# Patient Record
Sex: FEMALE | Race: WHITE | NOT HISPANIC OR LATINO | Employment: UNEMPLOYED | URBAN - METROPOLITAN AREA
[De-identification: names, ages, dates, MRNs, and addresses within clinical notes are randomized per-mention and may not be internally consistent; named-entity substitution may affect disease eponyms.]

---

## 2019-05-19 ENCOUNTER — HISTORICAL (OUTPATIENT)
Dept: URGENT CARE | Facility: CLINIC | Age: 64
End: 2019-05-19

## 2019-05-19 LAB
BILIRUB SERPL-MCNC: NEGATIVE MG/DL
BLOOD URINE, POC: NEGATIVE
CLARITY, POC UA: CLEAR
COLOR, POC UA: NORMAL
GLUCOSE UR QL STRIP: NEGATIVE
KETONES UR QL STRIP: NEGATIVE
LEUKOCYTE EST, POC UA: NORMAL
NITRITE, POC UA: NEGATIVE
PH, POC UA: 6.5
PROTEIN, POC: NEGATIVE
SPECIFIC GRAVITY, POC UA: 1.01
UROBILINOGEN, POC UA: NORMAL

## 2022-04-11 ENCOUNTER — HISTORICAL (OUTPATIENT)
Dept: ADMINISTRATIVE | Facility: HOSPITAL | Age: 67
End: 2022-04-11

## 2022-04-27 VITALS — DIASTOLIC BLOOD PRESSURE: 73 MMHG | OXYGEN SATURATION: 95 % | HEIGHT: 64 IN | SYSTOLIC BLOOD PRESSURE: 121 MMHG

## 2022-08-23 DIAGNOSIS — K44.0 DIAPHRAGMATIC HERNIA WITH OBSTRUCTION: Primary | ICD-10-CM

## 2022-08-24 ENCOUNTER — HOSPITAL ENCOUNTER (OUTPATIENT)
Dept: RADIOLOGY | Facility: HOSPITAL | Age: 67
Discharge: HOME | End: 2022-08-24
Attending: SURGERY
Payer: MEDICARE

## 2022-08-24 DIAGNOSIS — K44.0 DIAPHRAGMATIC HERNIA WITH OBSTRUCTION: ICD-10-CM

## 2022-08-24 PROCEDURE — A9698 NON-RAD CONTRAST MATERIALNOC: HCPCS | Performed by: SURGERY

## 2022-08-24 PROCEDURE — 25500020 PHARM REV CODE 255: Performed by: SURGERY

## 2022-08-24 PROCEDURE — 74240 X-RAY XM UPR GI TRC 1CNTRST: CPT | Mod: TC

## 2022-08-24 RX ADMIN — BARIUM SULFATE 100 ML: 980 POWDER, FOR SUSPENSION ORAL at 08:08

## 2022-08-24 RX ADMIN — BARIUM SULFATE 100 ML: 0.6 SUSPENSION ORAL at 08:08

## 2022-08-29 ENCOUNTER — LAB VISIT (OUTPATIENT)
Dept: LAB | Facility: HOSPITAL | Age: 67
End: 2022-08-29
Attending: SURGERY
Payer: MEDICARE

## 2022-08-29 DIAGNOSIS — E51.9 VITAMIN B1 DEFICIENCY: ICD-10-CM

## 2022-08-29 DIAGNOSIS — D51.1 VITAMIN B12 DEFICIENCY ANEMIA DUE TO MALABSORPTION WITH PROTEINURIA: ICD-10-CM

## 2022-08-29 DIAGNOSIS — E61.1 IRON DEFICIENCY: Primary | ICD-10-CM

## 2022-08-29 DIAGNOSIS — T45.2X6A UNDERDOSING OF VITAMIN: ICD-10-CM

## 2022-08-29 DIAGNOSIS — E11.9 DM (DIABETES MELLITUS): ICD-10-CM

## 2022-08-29 LAB
ALBUMIN SERPL-MCNC: 3.6 GM/DL (ref 3.4–4.8)
ALBUMIN/GLOB SERPL: 1.1 RATIO (ref 1.1–2)
ALP SERPL-CCNC: 73 UNIT/L (ref 40–150)
ALT SERPL-CCNC: 48 UNIT/L (ref 0–55)
AST SERPL-CCNC: 32 UNIT/L (ref 5–34)
BILIRUBIN DIRECT+TOT PNL SERPL-MCNC: 0.3 MG/DL
BUN SERPL-MCNC: 15.5 MG/DL (ref 9.8–20.1)
CALCIUM SERPL-MCNC: 10.4 MG/DL (ref 8.4–10.2)
CHLORIDE SERPL-SCNC: 104 MMOL/L (ref 98–107)
CO2 SERPL-SCNC: 28 MMOL/L (ref 23–31)
CREAT SERPL-MCNC: 0.97 MG/DL (ref 0.55–1.02)
DEPRECATED CALCIDIOL+CALCIFEROL SERPL-MC: 29.3 NG/ML (ref 30–80)
ERYTHROCYTE [DISTWIDTH] IN BLOOD BY AUTOMATED COUNT: 12.7 % (ref 11.5–17)
EST. AVERAGE GLUCOSE BLD GHB EST-MCNC: 131.2 MG/DL
GFR SERPLBLD CREATININE-BSD FMLA CKD-EPI: >60 MLS/MIN/1.73/M2
GLOBULIN SER-MCNC: 3.4 GM/DL (ref 2.4–3.5)
GLUCOSE SERPL-MCNC: 99 MG/DL (ref 82–115)
HBA1C MFR BLD: 6.2 %
HCT VFR BLD AUTO: 38.1 % (ref 37–47)
HGB BLD-MCNC: 12.5 GM/DL (ref 12–16)
IRON SATN MFR SERPL: 28 % (ref 20–50)
IRON SERPL-MCNC: 86 UG/DL (ref 50–170)
MCH RBC QN AUTO: 30.5 PG (ref 27–31)
MCHC RBC AUTO-ENTMCNC: 32.8 MG/DL (ref 33–36)
MCV RBC AUTO: 92.9 FL (ref 80–94)
NRBC BLD AUTO-RTO: 0 %
PLATELET # BLD AUTO: 302 X10(3)/MCL (ref 130–400)
PMV BLD AUTO: 9.2 FL (ref 7.4–10.4)
POTASSIUM SERPL-SCNC: 5.4 MMOL/L (ref 3.5–5.1)
PROT SERPL-MCNC: 7 GM/DL (ref 5.8–7.6)
RBC # BLD AUTO: 4.1 X10(6)/MCL (ref 4.2–5.4)
SODIUM SERPL-SCNC: 138 MMOL/L (ref 136–145)
TIBC SERPL-MCNC: 224 UG/DL (ref 70–310)
TIBC SERPL-MCNC: 310 UG/DL (ref 250–450)
TRANSFERRIN SERPL-MCNC: 275 MG/DL (ref 173–360)
VIT B12 SERPL-MCNC: 263 PG/ML (ref 213–816)
WBC # SPEC AUTO: 7.3 X10(3)/MCL (ref 4.5–11.5)

## 2022-08-29 PROCEDURE — 83540 ASSAY OF IRON: CPT

## 2022-08-29 PROCEDURE — 85027 COMPLETE CBC AUTOMATED: CPT

## 2022-08-29 PROCEDURE — 80053 COMPREHEN METABOLIC PANEL: CPT

## 2022-08-29 PROCEDURE — 83036 HEMOGLOBIN GLYCOSYLATED A1C: CPT

## 2022-08-29 PROCEDURE — 82306 VITAMIN D 25 HYDROXY: CPT

## 2022-08-29 PROCEDURE — 36415 COLL VENOUS BLD VENIPUNCTURE: CPT

## 2022-08-29 PROCEDURE — 82607 VITAMIN B-12: CPT

## 2022-09-21 ENCOUNTER — HISTORICAL (OUTPATIENT)
Dept: ADMINISTRATIVE | Facility: HOSPITAL | Age: 67
End: 2022-09-21
Payer: MEDICARE

## 2024-10-08 ENCOUNTER — TELEPHONE (OUTPATIENT)
Dept: SURGERY | Facility: CLINIC | Age: 69
End: 2024-10-08
Payer: MEDICARE

## 2024-10-08 DIAGNOSIS — K21.9 GASTROESOPHAGEAL REFLUX DISEASE, UNSPECIFIED WHETHER ESOPHAGITIS PRESENT: Primary | ICD-10-CM

## 2024-10-08 RX ORDER — TERBINAFINE HYDROCHLORIDE 250 MG/1
250 TABLET ORAL
COMMUNITY
Start: 2024-08-01

## 2024-10-08 RX ORDER — AMLODIPINE BESYLATE 5 MG/1
5 TABLET ORAL
COMMUNITY
Start: 2024-03-28

## 2024-10-08 RX ORDER — ALBUTEROL SULFATE 90 UG/1
2 INHALANT RESPIRATORY (INHALATION) EVERY 4 HOURS PRN
COMMUNITY
Start: 2024-03-28 | End: 2025-03-28

## 2024-10-08 RX ORDER — PRAMIPEXOLE DIHYDROCHLORIDE 0.5 MG/1
0.5 TABLET ORAL NIGHTLY
COMMUNITY
Start: 2024-09-13

## 2024-10-08 RX ORDER — DICYCLOMINE HYDROCHLORIDE 10 MG/1
10 CAPSULE ORAL
COMMUNITY
Start: 2024-09-19

## 2024-10-08 RX ORDER — DULOXETIN HYDROCHLORIDE 60 MG/1
60 CAPSULE, DELAYED RELEASE ORAL
COMMUNITY

## 2024-10-08 RX ORDER — PANTOPRAZOLE SODIUM 40 MG/1
40 TABLET, DELAYED RELEASE ORAL
COMMUNITY

## 2024-10-08 RX ORDER — VALSARTAN AND HYDROCHLOROTHIAZIDE 160; 25 MG/1; MG/1
1 TABLET ORAL
COMMUNITY
Start: 2024-09-03

## 2024-10-08 RX ORDER — ATORVASTATIN CALCIUM 20 MG/1
20 TABLET, FILM COATED ORAL NIGHTLY
COMMUNITY

## 2024-10-08 RX ORDER — ASPIRIN 81 MG/1
81 TABLET ORAL
COMMUNITY

## 2024-10-08 RX ORDER — TIRZEPATIDE 10 MG/.5ML
INJECTION, SOLUTION SUBCUTANEOUS
COMMUNITY
Start: 2024-10-07

## 2024-10-08 RX ORDER — TRAZODONE HYDROCHLORIDE 50 MG/1
50 TABLET ORAL NIGHTLY
COMMUNITY
Start: 2024-05-06

## 2024-10-08 RX ORDER — ALPRAZOLAM 0.5 MG/1
0.5 TABLET ORAL DAILY PRN
COMMUNITY

## 2024-10-08 RX ORDER — METFORMIN HYDROCHLORIDE 500 MG/1
500 TABLET ORAL 2 TIMES DAILY
COMMUNITY

## 2024-10-08 RX ORDER — LEVOTHYROXINE SODIUM 175 UG/1
175 TABLET ORAL
COMMUNITY

## 2024-10-08 RX ORDER — NABUMETONE 750 MG/1
750 TABLET, FILM COATED ORAL 2 TIMES DAILY
COMMUNITY
Start: 2024-09-13

## 2024-10-08 RX ORDER — TRAMADOL HYDROCHLORIDE 50 MG/1
50 TABLET ORAL EVERY 6 HOURS PRN
COMMUNITY

## 2024-10-08 NOTE — TELEPHONE ENCOUNTER
Esophagram scheduled 10/10.  Pt will do labs at PCPs office.   Appt scheduled 10/22 for appt with JANIA Soriano

## 2024-10-08 NOTE — TELEPHONE ENCOUNTER
Yes ok for appt. Will need some workup.   - Esophagram  - EGD within 12 months (previous one in 2022 with Dr. Grimm), will need a repeat.   - Bariatric labs.

## 2024-10-10 ENCOUNTER — HOSPITAL ENCOUNTER (OUTPATIENT)
Dept: RADIOLOGY | Facility: HOSPITAL | Age: 69
Discharge: HOME | End: 2024-10-10
Attending: SURGERY
Payer: MEDICARE

## 2024-10-10 DIAGNOSIS — K21.9 GASTROESOPHAGEAL REFLUX DISEASE, UNSPECIFIED WHETHER ESOPHAGITIS PRESENT: ICD-10-CM

## 2024-10-10 PROCEDURE — 25500020 PHARM REV CODE 255: Performed by: SURGERY

## 2024-10-10 PROCEDURE — A9698 NON-RAD CONTRAST MATERIALNOC: HCPCS | Performed by: SURGERY

## 2024-10-10 PROCEDURE — 74220 X-RAY XM ESOPHAGUS 1CNTRST: CPT | Mod: TC

## 2024-10-10 RX ADMIN — BARIUM SULFATE 50 ML: 0.6 SUSPENSION ORAL at 08:10

## 2024-10-10 RX ADMIN — BARIUM SULFATE 60 ML: 980 POWDER, FOR SUSPENSION ORAL at 08:10

## 2024-10-17 LAB
ALBUMIN SERPL-MCNC: 4 G/DL (ref 3.9–4.9)
ALP SERPL-CCNC: 69 IU/L (ref 44–121)
ALT SERPL-CCNC: 22 IU/L (ref 0–32)
AST SERPL-CCNC: 21 IU/L (ref 0–40)
BASOPHILS # BLD AUTO: 0.1 X10E3/UL (ref 0–0.2)
BASOPHILS NFR BLD AUTO: 1 %
BILIRUB SERPL-MCNC: 0.2 MG/DL (ref 0–1.2)
BUN SERPL-MCNC: 32 MG/DL (ref 8–27)
BUN/CREAT SERPL: 31 (ref 12–28)
CALCIUM SERPL-MCNC: 10.3 MG/DL (ref 8.7–10.3)
CHLORIDE SERPL-SCNC: 100 MMOL/L (ref 96–106)
CO2 SERPL-SCNC: 23 MMOL/L (ref 20–29)
CREAT SERPL-MCNC: 1.03 MG/DL (ref 0.57–1)
EOSINOPHIL # BLD AUTO: 0.3 X10E3/UL (ref 0–0.4)
EOSINOPHIL NFR BLD AUTO: 5 %
ERYTHROCYTE [DISTWIDTH] IN BLOOD BY AUTOMATED COUNT: 13.1 % (ref 11.7–15.4)
EST. GFR  (NO RACE VARIABLE): 60 ML/MIN/1.73
GLOBULIN SER CALC-MCNC: 3.1 G/DL (ref 1.5–4.5)
GLUCOSE SERPL-MCNC: 125 MG/DL (ref 70–99)
HCT VFR BLD AUTO: 39.1 % (ref 34–46.6)
HGB BLD-MCNC: 12.2 G/DL (ref 11.1–15.9)
IMM GRANULOCYTES NFR BLD AUTO: 0 %
IRON SATN MFR SERPL: 25 % (ref 15–55)
IRON SERPL-MCNC: 90 UG/DL (ref 27–139)
LYMPHOCYTES # BLD AUTO: 1.3 X10E3/UL (ref 0.7–3.1)
LYMPHOCYTES NFR BLD AUTO: 20 %
MCH RBC QN AUTO: 29.8 PG (ref 26.6–33)
MCHC RBC AUTO-ENTMCNC: 31.2 G/DL (ref 31.5–35.7)
MCV RBC AUTO: 95 FL (ref 79–97)
MONOCYTES # BLD AUTO: 0.7 X10E3/UL (ref 0.1–0.9)
MONOCYTES NFR BLD AUTO: 10 %
NEUTROPHILS # BLD AUTO: 4.3 X10E3/UL (ref 1.4–7)
NEUTROPHILS NFR BLD AUTO: 64 %
PLATELET # BLD AUTO: 328 X10E3/UL (ref 150–450)
POTASSIUM SERPL-SCNC: 4.2 MMOL/L (ref 3.5–5.2)
PROT SERPL-MCNC: 7.1 G/DL (ref 6–8.5)
RBC # BLD AUTO: 4.1 X10E6/UL (ref 3.77–5.28)
SODIUM SERPL-SCNC: 141 MMOL/L (ref 134–144)
TIBC SERPL-MCNC: 353 UG/DL (ref 250–450)
UIBC SERPL-MCNC: 263 UG/DL (ref 118–369)
VIT B1 BLD-SCNC: 124.9 NMOL/L (ref 66.5–200)
VIT B12 SERPL-MCNC: 298 PG/ML (ref 232–1245)
WBC # BLD AUTO: 6.8 X10E3/UL (ref 3.4–10.8)

## 2024-10-22 ENCOUNTER — OFFICE VISIT (OUTPATIENT)
Dept: SURGERY | Facility: CLINIC | Age: 69
End: 2024-10-22
Payer: MEDICARE

## 2024-10-22 VITALS
WEIGHT: 282.19 LBS | DIASTOLIC BLOOD PRESSURE: 76 MMHG | HEART RATE: 94 BPM | SYSTOLIC BLOOD PRESSURE: 133 MMHG | HEIGHT: 64 IN | BODY MASS INDEX: 48.18 KG/M2

## 2024-10-22 DIAGNOSIS — Z90.3 HISTORY OF SLEEVE GASTRECTOMY: ICD-10-CM

## 2024-10-22 DIAGNOSIS — K44.9 HIATAL HERNIA: Primary | ICD-10-CM

## 2024-10-22 DIAGNOSIS — Z01.818 PREOP EXAMINATION: ICD-10-CM

## 2024-10-22 PROCEDURE — 99204 OFFICE O/P NEW MOD 45 MIN: CPT | Mod: ,,, | Performed by: SURGERY

## 2024-10-22 RX ORDER — AMOXICILLIN AND CLAVULANATE POTASSIUM 875; 125 MG/1; MG/1
1 TABLET, FILM COATED ORAL 2 TIMES DAILY
COMMUNITY

## 2024-10-22 NOTE — PROGRESS NOTES
[facility] Surgical - General Surgery Services  [address] [place] [id number]  Phone: [phone]  Fax: [phone]     HISTORY & PHYSICAL  General Surgery  Dr. Bebo Yepez      Patient Name: Chapis Brar  YOB: 1955  Author: ISATU Louie     Date: 10/22/2024                   SUBJECTIVE:     Chief Complaint/Reason for Admission:   Chief Complaint   Patient presents with    Consult     Hiatal Hernia; HX of VSG          History of Present Illness:  Ms. Chapis Brar is a 69 y.o. female that presents to clinic for surgical evaluation she was found to have a hiatal hernia.     Recent ED visit at [facility] for LLQ abd pain 10/20/2024. Treated with IV Zosyn and discharged with Augmentin.   CT confirmed diverticulitis. CT also mentioned stable hiatal hernia.      Previous history of Laparoscopic sleeve gastrectomy 9/8/2015 per Dr. Bebo Yepez.  Previous Laparoscopic ventral hernia repair with 9 cm Round Parietex mesh 1/20/2017 Dr. Seth Hogan.       CT imaging report from [facility]:    Impression      Findings suggestive of acute diverticulitis at junction of left and sigmoid colon.  No evidence for abscess or extraluminal air.    Electronically signed by: Charlotte Vallecillo MD on 10/20/2024 10:00:11 PM US/Central  Per PQRS, all CT exams are performed using one or more of the following dose reduction techniques: automated exposure control, adjustment of the mA and/or kV according to patient size, or use of iterative reconstruction technique.  Narrative    EXAM:  CT Abdomen and Pelvis with Intravenous Contrast    CLINICAL HISTORY:  Hx:LLQ abdominal pain.    TECHNIQUE:  Axial computed tomography images of the abdomen and pelvis with intravenous contrast.    CONTRAST:  With; 100 ml OMNIPAQUE 300    COMPARISON:  CT/OT/SR - CT ABDOMEN PELVIS W IV CONTRAST - 09/24/2023 04:57 PM CDT    FINDINGS:    LUNG BASES:  No basilar airspace consolidation or pleural  effusion.    LIVER:  Unremarkable.    GALLBLADDER AND BILE DUCTS:  Status post cholecystectomy.    PANCREAS:  Unremarkable.    SPLEEN:  Unremarkable.    ADRENAL GLANDS:  Unremarkable.    KIDNEYS, URETERS, AND BLADDER:  Kidneys are stable in size and enhancement. Right kidney remains rotated. Stable right renal cyst that appears simple.    STOMACH AND BOWEL:  Stable hiatal hernia and gastric sleeve changes.  There is colonic diverticulosis. Asymmetric wall thickening and adjacent mesenteric soft tissue infiltration along the proximal lateral colon at the junction of the left and sigmoid colon suggesting acute diverticulitis. No extraluminal air or evidence for abscess. Stable right periumbilical hernia that contains anterior wall of the transverse colon. No evidence for wall thickening or surrounding soft tissue stranding. No evidence for bowel obstruction.    APPENDIX:  No CT evidence for appendicitis.    PERITONEUM:  No free fluid. No free air.    LYMPH NODES:  No lymphadenopathy.    REPRODUCTIVE:  Unremarkable as visualized.    VASCULATURE:  No aortic aneurysm.    BONES:  Skeleton is osteopenic. There is lumbar spondylosis with degenerative disc disease. No acute fracture or bone destruction. There is grade 1 anterolisthesis at L4-5.    ABDOMINAL WALL AND SOFT TISSUES:  Unremarkable.  Exam End: 10/20/24 20:55       Referring provider: No ref. provider found   PCP: Magno Enciso MD     Review of Systems:  12 point ROS negative except as stated in HPI    PAST HISTORY:     Past Medical History:   Diagnosis Date    Anxiety     Arthritis     Asthenia     Chronic back pain     Depression     Diabetes mellitus     Diverticulitis     GERD (gastroesophageal reflux disease)     Hiatal hernia     Hypertension     Kidney stones     Osteoarthritis     Sleep apnea, unspecified     Thyroid disease     Vitamin D deficiency      Past Surgical History:   Procedure Laterality Date    CHOLECYSTECTOMY      DILATION AND CURETTAGE  OF UTERUS      EXCISIONAL HEMORRHOIDECTOMY      HYSTERECTOMY      INGUINAL HERNIA REPAIR Right     LAPAROSCOPIC REPAIR OF VENTRAL HERNIA  01/20/2017    Dr. Seth Hogan    LAPAROSCOPIC SLEEVE GASTRECTOMY  09/08/2015    Dr. Yepez    TOTAL KNEE ARTHROPLASTY      WISDOM TOOTH EXTRACTION       No family history on file.  Social History     Socioeconomic History    Marital status:        MEDICATIONS & ALLERGIES:     Current Outpatient Medications on File Prior to Visit   Medication Sig    albuterol (PROVENTIL/VENTOLIN HFA) 90 mcg/actuation inhaler Inhale 2 puffs into the lungs every 4 (four) hours as needed.    ALPRAZolam (XANAX) 0.5 MG tablet Take 0.5 mg by mouth daily as needed.    amLODIPine (NORVASC) 5 MG tablet Take 5 mg by mouth.    aspirin (ECOTRIN) 81 MG EC tablet Take 81 mg by mouth.    atorvastatin (LIPITOR) 20 MG tablet Take 20 mg by mouth every evening.    dicyclomine (BENTYL) 10 MG capsule Take 10 mg by mouth.    DULoxetine (CYMBALTA) 60 MG capsule Take 60 mg by mouth.    levothyroxine (SYNTHROID, LEVOTHROID) 175 MCG tablet Take 175 mcg by mouth.    metFORMIN (GLUCOPHAGE) 500 MG tablet Take 500 mg by mouth 2 (two) times daily.    MOUNJARO 10 mg/0.5 mL PnIj SMARTSIG:10 Milligram(s) SUB-Q Once a Week    nabumetone (RELAFEN) 750 MG tablet Take 750 mg by mouth 2 (two) times daily.    pantoprazole (PROTONIX) 40 MG tablet Take 40 mg by mouth.    pramipexole (MIRAPEX) 0.5 MG tablet Take 0.5 mg by mouth every evening.    terbinafine HCL (LAMISIL) 250 mg tablet Take 250 mg by mouth.    traMADoL (ULTRAM) 50 mg tablet Take 50 mg by mouth every 6 (six) hours as needed.    traZODone (DESYREL) 50 MG tablet Take 50 mg by mouth every evening.    valsartan-hydrochlorothiazide (DIOVAN-HCT) 160-25 mg per tablet Take 1 tablet by mouth.     No current facility-administered medications on file prior to visit.     Review of patient's allergies indicates:  Not on File    OBJECTIVE:   There were no vitals filed for this  visit.  There is no height or weight on file to calculate BMI.    Physical Exam:  General:  Well developed, well nourished, no acute distress  HEENT:  Normocephalic, atraumatic, PERRL, EOMI, clear sclera, ears normal, neck supple, throat clear without erythema or exudates  CVS:  RRR, S1 and S2 normal, no murmurs, rubs, gallops  Resp:  Lungs clear to auscultation, no wheezes, rales, rhonchi, cough  GI:  Abdomen soft, non-tender, non-distended, normoactive bowel sounds, no masses  :  Deferred  MSK:  No muscle atrophy, cyanosis, peripheral edema, full range of motion  Skin:  No rashes, ulcers, erythema  Neuro:  CNII-XII grossly intact  Psych:  Alert and oriented to person, place, and time    Results:  I have independently reviewed all pertinent lab and radiologic studies relevant to general surgery.      VISIT DIAGNOSES:       ICD-10-CM ICD-9-CM   1. Preop examination  Z01.818 V72.84       ASSESSMENT/PLAN:     SP Lap VSG and Lap incisional hernia, active diverticulitis under treatment    Plan: cont current treatment per treating providers, no need for surgical therapy on HH at this time  Routine bariatric fu  Dr. Yepez examined patient, discussed recommendations, obtained informed consent, and answered all questions with patient/family.     Bebo Yepez MD      RTC annual bariatric visit or prn

## 2024-11-18 DIAGNOSIS — K57.92 DIVERTICULITIS: Primary | ICD-10-CM

## 2024-11-21 ENCOUNTER — OFFICE VISIT (OUTPATIENT)
Dept: SURGICAL ONCOLOGY | Facility: CLINIC | Age: 69
End: 2024-11-21
Payer: MEDICARE

## 2024-11-21 VITALS
BODY MASS INDEX: 47.43 KG/M2 | WEIGHT: 277.81 LBS | HEIGHT: 64 IN | HEART RATE: 85 BPM | SYSTOLIC BLOOD PRESSURE: 109 MMHG | DIASTOLIC BLOOD PRESSURE: 71 MMHG

## 2024-11-21 DIAGNOSIS — K57.92 DIVERTICULITIS: Primary | ICD-10-CM

## 2024-11-21 DIAGNOSIS — K57.92 DIVERTICULITIS: ICD-10-CM

## 2024-11-21 PROCEDURE — 99999 PR PBB SHADOW E&M-EST. PATIENT-LVL IV: CPT | Mod: PBBFAC,,, | Performed by: COLON & RECTAL SURGERY

## 2024-11-21 PROCEDURE — 99204 OFFICE O/P NEW MOD 45 MIN: CPT | Mod: S$PBB,,, | Performed by: COLON & RECTAL SURGERY

## 2024-11-21 PROCEDURE — 99214 OFFICE O/P EST MOD 30 MIN: CPT | Mod: PBBFAC | Performed by: COLON & RECTAL SURGERY

## 2024-11-21 RX ORDER — ENOXAPARIN SODIUM 100 MG/ML
40 INJECTION SUBCUTANEOUS EVERY 24 HOURS
OUTPATIENT
Start: 2024-11-21

## 2024-11-21 RX ORDER — ACETAMINOPHEN 500 MG
1000 TABLET ORAL
OUTPATIENT
Start: 2024-11-21 | End: 2024-11-21

## 2024-11-21 RX ORDER — METRONIDAZOLE 500 MG/100ML
500 INJECTION, SOLUTION INTRAVENOUS
OUTPATIENT
Start: 2024-11-21

## 2024-11-21 RX ORDER — LIDOCAINE HYDROCHLORIDE 10 MG/ML
1 INJECTION, SOLUTION EPIDURAL; INFILTRATION; INTRACAUDAL; PERINEURAL ONCE
OUTPATIENT
Start: 2024-11-21 | End: 2024-11-21

## 2024-11-21 RX ORDER — NEOMYCIN SULFATE 500 MG/1
1000 TABLET ORAL 3 TIMES DAILY
Qty: 6 TABLET | Refills: 0 | Status: SHIPPED | OUTPATIENT
Start: 2024-11-21

## 2024-11-21 RX ORDER — CELECOXIB 200 MG/1
400 CAPSULE ORAL
OUTPATIENT
Start: 2024-11-21 | End: 2024-11-21

## 2024-11-21 RX ORDER — GABAPENTIN 100 MG/1
200 CAPSULE ORAL
OUTPATIENT
Start: 2024-11-21

## 2024-11-21 RX ORDER — METRONIDAZOLE 500 MG/1
500 TABLET ORAL 3 TIMES DAILY
Qty: 3 TABLET | Refills: 0 | Status: SHIPPED | OUTPATIENT
Start: 2024-11-21

## 2024-11-21 NOTE — H&P (VIEW-ONLY)
Patient ID: [id number]     Chief Complaint: Diverticulitis      HPI:     Chapis Brar is a 69 y.o. female here today for an initial consultation.  She was referred by Dr. Augie Grimm for management of recurrent diverticulitis.  She reports 4 ER visits for left lower quadrant abdominal pain as well as 2 other episodes for which she was treated with oral antibiotics by her PCP.  She has 3 CT confirmed episodes of acute diverticulitis over the past 3 years.  Her most recent episode was 10/20/2024.  She is seeking definitive surgery.  Today she has no complaints.  Her most recent colonoscopy 10/18/2022 showed scattered sigmoid diverticulosis.    Past Medical History:  has a past medical history of Anxiety, Arthritis, Asthenia, Chronic back pain, Depression, Diabetes mellitus, Diverticulitis, GERD (gastroesophageal reflux disease), Hiatal hernia, Hypertension, Kidney stones, Osteoarthritis, Sleep apnea, unspecified, Thyroid disease, and Vitamin D deficiency.    Surgical History:  has a past surgical history that includes Laparoscopic sleeve gastrectomy (09/08/2015); Laparoscopic repair of ventral hernia (01/20/2017); Cholecystectomy; Dilation and curettage of uterus; [place] tooth extraction; Excisional hemorrhoidectomy; Hysterectomy; Inguinal hernia repair (Right); and Total knee arthroplasty.    Family History: Family history is unknown by patient.    Social History:  reports that she has never smoked. She has never used smokeless tobacco. She reports that she does not currently use alcohol.    Current Outpatient Medications   Medication Instructions    albuterol (PROVENTIL/VENTOLIN HFA) 90 mcg/actuation inhaler 2 puffs, Every 4 hours PRN    ALPRAZolam (XANAX) 0.5 mg, Daily PRN    amLODIPine (NORVASC) 5 mg    amoxicillin-clavulanate 875-125mg (AUGMENTIN) 875-125 mg per tablet 1 tablet, 2 times daily    aspirin (ECOTRIN) 81 mg    atorvastatin (LIPITOR) 20 mg, Nightly    DULoxetine (CYMBALTA) 60 mg    levothyroxine  "(SYNTHROID, LEVOTHROID) 175 mcg    metFORMIN (GLUCOPHAGE) 500 mg, 2 times daily    metroNIDAZOLE (FLAGYL) 500 mg, Oral, 3 times daily    MOUNJARO 10 mg/0.5 mL PnIj Mondays, but took it today she for got to take it yesterday    nabumetone (RELAFEN) 750 mg, 2 times daily    neomycin (MYCIFRADIN) 1,000 mg, Oral, 3 times daily    pantoprazole (PROTONIX) 40 mg    pramipexole (MIRAPEX) 0.5 mg, Nightly    terbinafine HCL (LAMISIL) 250 mg    traMADoL (ULTRAM) 50 mg, Every 6 hours PRN    traZODone (DESYREL) 50 mg, Nightly    valsartan-hydrochlorothiazide (DIOVAN-HCT) 160-25 mg per tablet 1 tablet       Patient has No Known Allergies.     Patient Care Team:  Magno Enciso MD as PCP - General (Family Medicine)  Bebo Yepez MD as Surgeon (Bariatrics)  Scott Grimm MD (Gastroenterology)       Subjective:     Review of Systems    12 point review of systems conducted, negative except as stated in the history of present illness. See HPI for details.      Objective:     Visit Vitals  /71 (BP Location: Left arm, Patient Position: Sitting)   Pulse 85   Ht 5' 4" (1.626 m)   Wt 126 kg (277 lb 12.8 oz)   SpO2 (P) 96%   BMI 47.68 kg/m²       Physical Exam    General: Alert and oriented, No acute distress.  Head: Normocephalic, Atraumatic.  Eye: Pupils are equal and round, Extraocular movements are intact, Sclera non-icteric.  Ears/Nose/Throat: Normal, Mucosa moist, Clear.  Respiratory: No wheezes, Non-labored respirations, Symmetrical chest wall expansion.  Cardiovascular: Regular rate.  Gastrointestinal: Obese, Soft, Non-tender, Non-distended, Normal bowel sounds, No palpable masses.  Healed low midline surgical scar.  Integumentary: Warm, Dry, Intact, No rashes.  Extremities: No edema.  Neurologic: No focal deficits.  Psychiatric: Normal interaction, Coherent speech, Euthymic mood, Appropriate affect.       Labs Reviewed:     Chemistry:  Lab Results   Component Value Date     10/10/2024    K 4.2 10/10/2024 "    BUN 32 (H) 10/10/2024    CREATININE 1.03 (H) 10/10/2024    EGFRNORACEVR 60 10/10/2024    GLUCOSE 99 08/29/2022    CALCIUM 10.3 10/10/2024    ALKPHOS 73 08/29/2022    LABPROT 7.0 08/29/2022    ALBUMIN 4.0 10/10/2024    BILIDIR 0.10 03/01/2020    IBILI 0.20 03/01/2020    AST 21 10/10/2024    ALT 22 10/10/2024        Hematology:  Lab Results   Component Value Date    WBC 6.8 10/10/2024    HGB 12.2 10/10/2024    HCT 39.1 10/10/2024     10/10/2024         Assessment:       ICD-10-CM ICD-9-CM   1. Diverticulitis  K57.92 562.11        Plan:     I explained the pathophysiology of the disease process and reason for surgery.  I also explained the surgery, potential adverse events, and expected recovery course.  Schedule laparoscopic sigmoid colectomy 12/17/2024.      No follow-ups on file. In addition to their scheduled follow up, the patient has also been instructed to follow up on as needed basis.     No future appointments.     Alejandro Hood MD

## 2024-11-21 NOTE — PROGRESS NOTES
Patient ID: [id number]     Chief Complaint: Diverticulitis      HPI:     Chapis Brar is a 69 y.o. female here today for an initial consultation.  She was referred by Dr. Augie Grimm for management of recurrent diverticulitis.  She reports 4 ER visits for left lower quadrant abdominal pain as well as 2 other episodes for which she was treated with oral antibiotics by her PCP.  She has 3 CT confirmed episodes of acute diverticulitis over the past 3 years.  Her most recent episode was 10/20/2024.  She is seeking definitive surgery.  Today she has no complaints.  Her most recent colonoscopy 10/18/2022 showed scattered sigmoid diverticulosis.    Past Medical History:  has a past medical history of Anxiety, Arthritis, Asthenia, Chronic back pain, Depression, Diabetes mellitus, Diverticulitis, GERD (gastroesophageal reflux disease), Hiatal hernia, Hypertension, Kidney stones, Osteoarthritis, Sleep apnea, unspecified, Thyroid disease, and Vitamin D deficiency.    Surgical History:  has a past surgical history that includes Laparoscopic sleeve gastrectomy (09/08/2015); Laparoscopic repair of ventral hernia (01/20/2017); Cholecystectomy; Dilation and curettage of uterus; [place] tooth extraction; Excisional hemorrhoidectomy; Hysterectomy; Inguinal hernia repair (Right); and Total knee arthroplasty.    Family History: Family history is unknown by patient.    Social History:  reports that she has never smoked. She has never used smokeless tobacco. She reports that she does not currently use alcohol.    Current Outpatient Medications   Medication Instructions    albuterol (PROVENTIL/VENTOLIN HFA) 90 mcg/actuation inhaler 2 puffs, Every 4 hours PRN    ALPRAZolam (XANAX) 0.5 mg, Daily PRN    amLODIPine (NORVASC) 5 mg    amoxicillin-clavulanate 875-125mg (AUGMENTIN) 875-125 mg per tablet 1 tablet, 2 times daily    aspirin (ECOTRIN) 81 mg    atorvastatin (LIPITOR) 20 mg, Nightly    DULoxetine (CYMBALTA) 60 mg    levothyroxine  "(SYNTHROID, LEVOTHROID) 175 mcg    metFORMIN (GLUCOPHAGE) 500 mg, 2 times daily    metroNIDAZOLE (FLAGYL) 500 mg, Oral, 3 times daily    MOUNJARO 10 mg/0.5 mL PnIj Mondays, but took it today she for got to take it yesterday    nabumetone (RELAFEN) 750 mg, 2 times daily    neomycin (MYCIFRADIN) 1,000 mg, Oral, 3 times daily    pantoprazole (PROTONIX) 40 mg    pramipexole (MIRAPEX) 0.5 mg, Nightly    terbinafine HCL (LAMISIL) 250 mg    traMADoL (ULTRAM) 50 mg, Every 6 hours PRN    traZODone (DESYREL) 50 mg, Nightly    valsartan-hydrochlorothiazide (DIOVAN-HCT) 160-25 mg per tablet 1 tablet       Patient has No Known Allergies.     Patient Care Team:  Magno Enciso MD as PCP - General (Family Medicine)  Bebo Yepez MD as Surgeon (Bariatrics)  Scott Grimm MD (Gastroenterology)       Subjective:     Review of Systems    12 point review of systems conducted, negative except as stated in the history of present illness. See HPI for details.      Objective:     Visit Vitals  /71 (BP Location: Left arm, Patient Position: Sitting)   Pulse 85   Ht 5' 4" (1.626 m)   Wt 126 kg (277 lb 12.8 oz)   SpO2 (P) 96%   BMI 47.68 kg/m²       Physical Exam    General: Alert and oriented, No acute distress.  Head: Normocephalic, Atraumatic.  Eye: Pupils are equal and round, Extraocular movements are intact, Sclera non-icteric.  Ears/Nose/Throat: Normal, Mucosa moist, Clear.  Respiratory: No wheezes, Non-labored respirations, Symmetrical chest wall expansion.  Cardiovascular: Regular rate.  Gastrointestinal: Obese, Soft, Non-tender, Non-distended, Normal bowel sounds, No palpable masses.  Healed low midline surgical scar.  Integumentary: Warm, Dry, Intact, No rashes.  Extremities: No edema.  Neurologic: No focal deficits.  Psychiatric: Normal interaction, Coherent speech, Euthymic mood, Appropriate affect.       Labs Reviewed:     Chemistry:  Lab Results   Component Value Date     10/10/2024    K 4.2 10/10/2024 "    BUN 32 (H) 10/10/2024    CREATININE 1.03 (H) 10/10/2024    EGFRNORACEVR 60 10/10/2024    GLUCOSE 99 08/29/2022    CALCIUM 10.3 10/10/2024    ALKPHOS 73 08/29/2022    LABPROT 7.0 08/29/2022    ALBUMIN 4.0 10/10/2024    BILIDIR 0.10 03/01/2020    IBILI 0.20 03/01/2020    AST 21 10/10/2024    ALT 22 10/10/2024        Hematology:  Lab Results   Component Value Date    WBC 6.8 10/10/2024    HGB 12.2 10/10/2024    HCT 39.1 10/10/2024     10/10/2024         Assessment:       ICD-10-CM ICD-9-CM   1. Diverticulitis  K57.92 562.11        Plan:     I explained the pathophysiology of the disease process and reason for surgery.  I also explained the surgery, potential adverse events, and expected recovery course.  Schedule laparoscopic sigmoid colectomy 12/17/2024.      No follow-ups on file. In addition to their scheduled follow up, the patient has also been instructed to follow up on as needed basis.     No future appointments.     Alejandro Hood MD

## 2024-12-04 ENCOUNTER — ANESTHESIA EVENT (OUTPATIENT)
Dept: SURGERY | Facility: HOSPITAL | Age: 69
DRG: 330 | End: 2024-12-04
Payer: MEDICARE

## 2024-12-10 ENCOUNTER — HOSPITAL ENCOUNTER (OUTPATIENT)
Dept: RADIOLOGY | Facility: HOSPITAL | Age: 69
Discharge: HOME | End: 2024-12-10
Attending: COLON & RECTAL SURGERY
Payer: MEDICARE

## 2024-12-10 DIAGNOSIS — K57.92 DIVERTICULITIS: ICD-10-CM

## 2024-12-10 PROCEDURE — 71046 X-RAY EXAM CHEST 2 VIEWS: CPT | Mod: TC

## 2024-12-12 NOTE — CLINICAL REVIEW
"labs, CXR, EKG reviewed. sd //     cardiology notes ood but sufficient for case. patient noted to have "pleuritic chest pain" intermittently (per [facility] visit 3/28/24; Dr. Enciso OV 10/7/24). EKG same as previous. chart review complete. ccv   "

## 2024-12-16 RX ORDER — ACETAMINOPHEN AND PHENYLEPHRINE HCL 325; 5 MG/1; MG/1
1 TABLET ORAL DAILY
COMMUNITY

## 2024-12-16 RX ORDER — ZINC GLUCONATE 50 MG
50 TABLET ORAL DAILY
COMMUNITY

## 2024-12-17 ENCOUNTER — HOSPITAL ENCOUNTER (INPATIENT)
Facility: HOSPITAL | Age: 69
LOS: 2 days | Discharge: HOME | DRG: 330 | End: 2024-12-19
Attending: COLON & RECTAL SURGERY | Admitting: COLON & RECTAL SURGERY
Payer: MEDICARE

## 2024-12-17 ENCOUNTER — ANESTHESIA (OUTPATIENT)
Dept: SURGERY | Facility: HOSPITAL | Age: 69
DRG: 330 | End: 2024-12-17
Payer: MEDICARE

## 2024-12-17 DIAGNOSIS — K57.32 DIVERTICULITIS LARGE INTESTINE W/O PERFORATION OR ABSCESS W/O BLEEDING: Primary | ICD-10-CM

## 2024-12-17 DIAGNOSIS — K57.92 DIVERTICULITIS: ICD-10-CM

## 2024-12-17 LAB
GROUP & RH: NORMAL
INDIRECT COOMBS: NORMAL
POCT GLUCOSE: 107 MG/DL (ref 70–110)
POCT GLUCOSE: 148 MG/DL (ref 70–110)
POCT GLUCOSE: 174 MG/DL (ref 70–110)
SPECIMEN OUTDATE: NORMAL

## 2024-12-17 PROCEDURE — 27201423 OPTIME MED/SURG SUP & DEVICES STERILE SUPPLY: Performed by: COLON & RECTAL SURGERY

## 2024-12-17 PROCEDURE — 37000009 HC ANESTHESIA EA ADD 15 MINS: Performed by: COLON & RECTAL SURGERY

## 2024-12-17 PROCEDURE — 36000711: Performed by: COLON & RECTAL SURGERY

## 2024-12-17 PROCEDURE — 63600175 PHARM REV CODE 636 W HCPCS

## 2024-12-17 PROCEDURE — 37000008 HC ANESTHESIA 1ST 15 MINUTES: Performed by: COLON & RECTAL SURGERY

## 2024-12-17 PROCEDURE — 36415 COLL VENOUS BLD VENIPUNCTURE: CPT | Performed by: COLON & RECTAL SURGERY

## 2024-12-17 PROCEDURE — 99900031 HC PATIENT EDUCATION (STAT)

## 2024-12-17 PROCEDURE — 71000039 HC RECOVERY, EACH ADD'L HOUR: Performed by: COLON & RECTAL SURGERY

## 2024-12-17 PROCEDURE — 25000003 PHARM REV CODE 250

## 2024-12-17 PROCEDURE — 63600175 PHARM REV CODE 636 W HCPCS: Performed by: ANESTHESIOLOGY

## 2024-12-17 PROCEDURE — 0DTN4ZZ RESECTION OF SIGMOID COLON, PERCUTANEOUS ENDOSCOPIC APPROACH: ICD-10-PCS | Performed by: COLON & RECTAL SURGERY

## 2024-12-17 PROCEDURE — 36000710: Performed by: COLON & RECTAL SURGERY

## 2024-12-17 PROCEDURE — C9290 INJ, BUPIVACAINE LIPOSOME: HCPCS | Performed by: COLON & RECTAL SURGERY

## 2024-12-17 PROCEDURE — 86900 BLOOD TYPING SEROLOGIC ABO: CPT | Performed by: COLON & RECTAL SURGERY

## 2024-12-17 PROCEDURE — 63600175 PHARM REV CODE 636 W HCPCS: Performed by: COLON & RECTAL SURGERY

## 2024-12-17 PROCEDURE — 94799 UNLISTED PULMONARY SVC/PX: CPT

## 2024-12-17 PROCEDURE — 11000001 HC ACUTE MED/SURG PRIVATE ROOM

## 2024-12-17 PROCEDURE — P9047 ALBUMIN (HUMAN), 25%, 50ML: HCPCS | Mod: JZ,JG

## 2024-12-17 PROCEDURE — 25000003 PHARM REV CODE 250: Performed by: COLON & RECTAL SURGERY

## 2024-12-17 PROCEDURE — 71000015 HC POSTOP RECOV 1ST HR: Performed by: COLON & RECTAL SURGERY

## 2024-12-17 PROCEDURE — 71000016 HC POSTOP RECOV ADDL HR: Performed by: COLON & RECTAL SURGERY

## 2024-12-17 PROCEDURE — 71000033 HC RECOVERY, INTIAL HOUR: Performed by: COLON & RECTAL SURGERY

## 2024-12-17 PROCEDURE — 88307 TISSUE EXAM BY PATHOLOGIST: CPT | Performed by: COLON & RECTAL SURGERY

## 2024-12-17 RX ORDER — HYDROMORPHONE HYDROCHLORIDE 2 MG/ML
0.2 INJECTION, SOLUTION INTRAMUSCULAR; INTRAVENOUS; SUBCUTANEOUS EVERY 5 MIN PRN
Status: DISCONTINUED | OUTPATIENT
Start: 2024-12-17 | End: 2024-12-17 | Stop reason: HOSPADM

## 2024-12-17 RX ORDER — EPHEDRINE SULFATE 50 MG/ML
25 INJECTION, SOLUTION INTRAVENOUS ONCE
Status: DISCONTINUED | OUTPATIENT
Start: 2024-12-17 | End: 2024-12-17

## 2024-12-17 RX ORDER — LIDOCAINE HYDROCHLORIDE 10 MG/ML
1 INJECTION, SOLUTION EPIDURAL; INFILTRATION; INTRACAUDAL; PERINEURAL ONCE
Status: DISCONTINUED | OUTPATIENT
Start: 2024-12-17 | End: 2024-12-17

## 2024-12-17 RX ORDER — SODIUM CHLORIDE 9 MG/ML
INJECTION, SOLUTION INTRAVENOUS CONTINUOUS
Status: DISCONTINUED | OUTPATIENT
Start: 2024-12-17 | End: 2024-12-19

## 2024-12-17 RX ORDER — ETOMIDATE 2 MG/ML
INJECTION INTRAVENOUS
Status: DISCONTINUED | OUTPATIENT
Start: 2024-12-17 | End: 2024-12-17

## 2024-12-17 RX ORDER — CALCIUM CHLORIDE INJECTION 100 MG/ML
INJECTION, SOLUTION INTRAVENOUS
Status: DISCONTINUED | OUTPATIENT
Start: 2024-12-17 | End: 2024-12-17

## 2024-12-17 RX ORDER — ACETAMINOPHEN 500 MG
1000 TABLET ORAL
Status: COMPLETED | OUTPATIENT
Start: 2024-12-17 | End: 2024-12-17

## 2024-12-17 RX ORDER — DIPHENHYDRAMINE HYDROCHLORIDE 50 MG/ML
25 INJECTION INTRAMUSCULAR; INTRAVENOUS EVERY 6 HOURS PRN
Status: DISCONTINUED | OUTPATIENT
Start: 2024-12-17 | End: 2024-12-17 | Stop reason: HOSPADM

## 2024-12-17 RX ORDER — BUPIVACAINE 13.3 MG/ML
INJECTION, SUSPENSION, LIPOSOMAL INFILTRATION
Status: DISCONTINUED | OUTPATIENT
Start: 2024-12-17 | End: 2024-12-17

## 2024-12-17 RX ORDER — PANTOPRAZOLE SODIUM 40 MG/1
40 TABLET, DELAYED RELEASE ORAL DAILY
Status: DISCONTINUED | OUTPATIENT
Start: 2024-12-18 | End: 2024-12-19 | Stop reason: HOSPADM

## 2024-12-17 RX ORDER — EPHEDRINE SULFATE 50 MG/ML
25 INJECTION, SOLUTION INTRAVENOUS ONCE
Status: COMPLETED | OUTPATIENT
Start: 2024-12-17 | End: 2024-12-17

## 2024-12-17 RX ORDER — PHENYLEPHRINE HYDROCHLORIDE 10 MG/ML
INJECTION INTRAVENOUS
Status: DISCONTINUED | OUTPATIENT
Start: 2024-12-17 | End: 2024-12-17

## 2024-12-17 RX ORDER — GLUCAGON 1 MG
1 KIT INJECTION
Status: DISCONTINUED | OUTPATIENT
Start: 2024-12-17 | End: 2024-12-19 | Stop reason: HOSPADM

## 2024-12-17 RX ORDER — BUPIVACAINE HYDROCHLORIDE 5 MG/ML
INJECTION, SOLUTION EPIDURAL; INTRACAUDAL
Status: DISCONTINUED | OUTPATIENT
Start: 2024-12-17 | End: 2024-12-17

## 2024-12-17 RX ORDER — IPRATROPIUM BROMIDE AND ALBUTEROL SULFATE 2.5; .5 MG/3ML; MG/3ML
3 SOLUTION RESPIRATORY (INHALATION)
Status: DISCONTINUED | OUTPATIENT
Start: 2024-12-17 | End: 2024-12-17 | Stop reason: HOSPADM

## 2024-12-17 RX ORDER — OXYCODONE AND ACETAMINOPHEN 5; 325 MG/1; MG/1
1 TABLET ORAL EVERY 4 HOURS PRN
Status: DISCONTINUED | OUTPATIENT
Start: 2024-12-17 | End: 2024-12-19 | Stop reason: HOSPADM

## 2024-12-17 RX ORDER — IBUPROFEN 200 MG
16 TABLET ORAL
Status: DISCONTINUED | OUTPATIENT
Start: 2024-12-17 | End: 2024-12-19 | Stop reason: HOSPADM

## 2024-12-17 RX ORDER — PROCHLORPERAZINE EDISYLATE 5 MG/ML
5 INJECTION INTRAMUSCULAR; INTRAVENOUS EVERY 6 HOURS PRN
Status: DISCONTINUED | OUTPATIENT
Start: 2024-12-17 | End: 2024-12-19 | Stop reason: HOSPADM

## 2024-12-17 RX ORDER — MORPHINE SULFATE 4 MG/ML
2 INJECTION, SOLUTION INTRAMUSCULAR; INTRAVENOUS EVERY 4 HOURS PRN
Status: DISCONTINUED | OUTPATIENT
Start: 2024-12-17 | End: 2024-12-19 | Stop reason: HOSPADM

## 2024-12-17 RX ORDER — ONDANSETRON HYDROCHLORIDE 2 MG/ML
4 INJECTION, SOLUTION INTRAVENOUS DAILY PRN
Status: DISCONTINUED | OUTPATIENT
Start: 2024-12-17 | End: 2024-12-17 | Stop reason: HOSPADM

## 2024-12-17 RX ORDER — PROPOFOL 10 MG/ML
VIAL (ML) INTRAVENOUS
Status: DISCONTINUED | OUTPATIENT
Start: 2024-12-17 | End: 2024-12-17

## 2024-12-17 RX ORDER — PROCHLORPERAZINE EDISYLATE 5 MG/ML
5 INJECTION INTRAMUSCULAR; INTRAVENOUS EVERY 30 MIN PRN
Status: DISCONTINUED | OUTPATIENT
Start: 2024-12-17 | End: 2024-12-17 | Stop reason: HOSPADM

## 2024-12-17 RX ORDER — ROCURONIUM BROMIDE 10 MG/ML
INJECTION, SOLUTION INTRAVENOUS
Status: DISCONTINUED | OUTPATIENT
Start: 2024-12-17 | End: 2024-12-17

## 2024-12-17 RX ORDER — SODIUM CHLORIDE, SODIUM GLUCONATE, SODIUM ACETATE, POTASSIUM CHLORIDE AND MAGNESIUM CHLORIDE 30; 37; 368; 526; 502 MG/100ML; MG/100ML; MG/100ML; MG/100ML; MG/100ML
INJECTION, SOLUTION INTRAVENOUS CONTINUOUS
Status: DISCONTINUED | OUTPATIENT
Start: 2024-12-17 | End: 2024-12-17

## 2024-12-17 RX ORDER — IBUPROFEN 200 MG
24 TABLET ORAL
Status: DISCONTINUED | OUTPATIENT
Start: 2024-12-17 | End: 2024-12-19 | Stop reason: HOSPADM

## 2024-12-17 RX ORDER — ENOXAPARIN SODIUM 100 MG/ML
40 INJECTION SUBCUTANEOUS EVERY 24 HOURS
Status: DISCONTINUED | OUTPATIENT
Start: 2024-12-17 | End: 2024-12-17

## 2024-12-17 RX ORDER — ONDANSETRON HYDROCHLORIDE 2 MG/ML
4 INJECTION, SOLUTION INTRAVENOUS EVERY 8 HOURS PRN
Status: DISCONTINUED | OUTPATIENT
Start: 2024-12-17 | End: 2024-12-19 | Stop reason: HOSPADM

## 2024-12-17 RX ORDER — DEXAMETHASONE SODIUM PHOSPHATE 4 MG/ML
INJECTION, SOLUTION INTRA-ARTICULAR; INTRALESIONAL; INTRAMUSCULAR; INTRAVENOUS; SOFT TISSUE
Status: DISCONTINUED | OUTPATIENT
Start: 2024-12-17 | End: 2024-12-17

## 2024-12-17 RX ORDER — LIDOCAINE HYDROCHLORIDE 20 MG/ML
INJECTION, SOLUTION EPIDURAL; INFILTRATION; INTRACAUDAL; PERINEURAL
Status: DISCONTINUED | OUTPATIENT
Start: 2024-12-17 | End: 2024-12-17

## 2024-12-17 RX ORDER — ALPRAZOLAM 0.5 MG/1
0.5 TABLET ORAL DAILY PRN
Status: DISCONTINUED | OUTPATIENT
Start: 2024-12-17 | End: 2024-12-19 | Stop reason: HOSPADM

## 2024-12-17 RX ORDER — ALBUMIN HUMAN 250 G/1000ML
SOLUTION INTRAVENOUS
Status: DISCONTINUED | OUTPATIENT
Start: 2024-12-17 | End: 2024-12-17

## 2024-12-17 RX ORDER — MUPIROCIN 20 MG/G
OINTMENT TOPICAL 2 TIMES DAILY
Status: DISCONTINUED | OUTPATIENT
Start: 2024-12-18 | End: 2024-12-19 | Stop reason: HOSPADM

## 2024-12-17 RX ORDER — KETOROLAC TROMETHAMINE 30 MG/ML
15 INJECTION, SOLUTION INTRAMUSCULAR; INTRAVENOUS EVERY 6 HOURS
Status: DISCONTINUED | OUTPATIENT
Start: 2024-12-17 | End: 2024-12-19 | Stop reason: HOSPADM

## 2024-12-17 RX ORDER — SODIUM CITRATE AND CITRIC ACID MONOHYDRATE 334; 500 MG/5ML; MG/5ML
30 SOLUTION ORAL ONCE
Status: CANCELLED | OUTPATIENT
Start: 2024-12-17 | End: 2024-12-17

## 2024-12-17 RX ORDER — GLUCAGON 1 MG
1 KIT INJECTION
Status: DISCONTINUED | OUTPATIENT
Start: 2024-12-17 | End: 2024-12-17 | Stop reason: HOSPADM

## 2024-12-17 RX ORDER — ONDANSETRON HYDROCHLORIDE 2 MG/ML
INJECTION, SOLUTION INTRAVENOUS
Status: DISCONTINUED | OUTPATIENT
Start: 2024-12-17 | End: 2024-12-17

## 2024-12-17 RX ORDER — EPHEDRINE SULFATE 50 MG/ML
INJECTION, SOLUTION INTRAVENOUS
Status: DISCONTINUED | OUTPATIENT
Start: 2024-12-17 | End: 2024-12-17

## 2024-12-17 RX ORDER — MIDAZOLAM HYDROCHLORIDE 2 MG/2ML
2 INJECTION, SOLUTION INTRAMUSCULAR; INTRAVENOUS
Status: DISCONTINUED | OUTPATIENT
Start: 2024-12-17 | End: 2024-12-17

## 2024-12-17 RX ORDER — INSULIN ASPART 100 [IU]/ML
0-5 INJECTION, SOLUTION INTRAVENOUS; SUBCUTANEOUS
Status: DISCONTINUED | OUTPATIENT
Start: 2024-12-17 | End: 2024-12-19 | Stop reason: HOSPADM

## 2024-12-17 RX ORDER — GABAPENTIN 100 MG/1
200 CAPSULE ORAL
Status: COMPLETED | OUTPATIENT
Start: 2024-12-17 | End: 2024-12-17

## 2024-12-17 RX ORDER — CEFTRIAXONE 2 G/1
2 INJECTION, POWDER, FOR SOLUTION INTRAMUSCULAR; INTRAVENOUS
Status: COMPLETED | OUTPATIENT
Start: 2024-12-17 | End: 2024-12-17

## 2024-12-17 RX ORDER — METRONIDAZOLE 500 MG/100ML
500 INJECTION, SOLUTION INTRAVENOUS
Status: COMPLETED | OUTPATIENT
Start: 2024-12-17 | End: 2024-12-17

## 2024-12-17 RX ORDER — ALBUTEROL SULFATE 90 UG/1
2 INHALANT RESPIRATORY (INHALATION) EVERY 4 HOURS PRN
Status: DISCONTINUED | OUTPATIENT
Start: 2024-12-17 | End: 2024-12-19 | Stop reason: HOSPADM

## 2024-12-17 RX ORDER — DICYCLOMINE HYDROCHLORIDE 10 MG/1
10 CAPSULE ORAL 3 TIMES DAILY
COMMUNITY

## 2024-12-17 RX ORDER — DULOXETIN HYDROCHLORIDE 30 MG/1
60 CAPSULE, DELAYED RELEASE ORAL DAILY
Status: DISCONTINUED | OUTPATIENT
Start: 2024-12-17 | End: 2024-12-19 | Stop reason: HOSPADM

## 2024-12-17 RX ORDER — FENTANYL CITRATE 50 UG/ML
INJECTION, SOLUTION INTRAMUSCULAR; INTRAVENOUS
Status: DISCONTINUED | OUTPATIENT
Start: 2024-12-17 | End: 2024-12-17

## 2024-12-17 RX ORDER — MEPERIDINE HYDROCHLORIDE 25 MG/ML
12.5 INJECTION INTRAMUSCULAR; INTRAVENOUS; SUBCUTANEOUS EVERY 10 MIN PRN
Status: DISCONTINUED | OUTPATIENT
Start: 2024-12-17 | End: 2024-12-17 | Stop reason: HOSPADM

## 2024-12-17 RX ORDER — CELECOXIB 200 MG/1
400 CAPSULE ORAL
Status: COMPLETED | OUTPATIENT
Start: 2024-12-17 | End: 2024-12-17

## 2024-12-17 RX ADMIN — EPHEDRINE SULFATE 25 MG: 50 INJECTION INTRAVENOUS at 04:12

## 2024-12-17 RX ADMIN — PHENYLEPHRINE HYDROCHLORIDE 100 MCG: 10 INJECTION INTRAVENOUS at 10:12

## 2024-12-17 RX ADMIN — PROPOFOL 50 MG: 10 INJECTION, EMULSION INTRAVENOUS at 10:12

## 2024-12-17 RX ADMIN — METRONIDAZOLE 500 MG: 5 INJECTION, SOLUTION INTRAVENOUS at 10:12

## 2024-12-17 RX ADMIN — ROCURONIUM BROMIDE 10 MG: 10 SOLUTION INTRAVENOUS at 12:12

## 2024-12-17 RX ADMIN — FENTANYL CITRATE 50 MCG: 50 INJECTION, SOLUTION INTRAMUSCULAR; INTRAVENOUS at 01:12

## 2024-12-17 RX ADMIN — PROPOFOL 100 MG: 10 INJECTION, EMULSION INTRAVENOUS at 10:12

## 2024-12-17 RX ADMIN — CALCIUM CHLORIDE INJECTION 250 MG: 100 INJECTION, SOLUTION INTRAVENOUS at 10:12

## 2024-12-17 RX ADMIN — PHENYLEPHRINE HYDROCHLORIDE 100 MCG: 10 INJECTION INTRAVENOUS at 01:12

## 2024-12-17 RX ADMIN — GABAPENTIN 200 MG: 100 CAPSULE ORAL at 08:12

## 2024-12-17 RX ADMIN — CALCIUM CHLORIDE INJECTION 250 MG: 100 INJECTION, SOLUTION INTRAVENOUS at 11:12

## 2024-12-17 RX ADMIN — LIDOCAINE HYDROCHLORIDE 80 MG: 20 INJECTION, SOLUTION INTRAVENOUS at 10:12

## 2024-12-17 RX ADMIN — ACETAMINOPHEN 1000 MG: 500 TABLET ORAL at 08:12

## 2024-12-17 RX ADMIN — ROCURONIUM BROMIDE 50 MG: 10 SOLUTION INTRAVENOUS at 10:12

## 2024-12-17 RX ADMIN — ROCURONIUM BROMIDE 10 MG: 10 SOLUTION INTRAVENOUS at 11:12

## 2024-12-17 RX ADMIN — ALBUMIN (HUMAN) 100 ML: 12.5 SOLUTION INTRAVENOUS at 10:12

## 2024-12-17 RX ADMIN — ETOMIDATE 20 MG: 2 INJECTION INTRAVENOUS at 10:12

## 2024-12-17 RX ADMIN — FENTANYL CITRATE 50 MCG: 50 INJECTION, SOLUTION INTRAMUSCULAR; INTRAVENOUS at 10:12

## 2024-12-17 RX ADMIN — SODIUM CHLORIDE: 9 INJECTION, SOLUTION INTRAVENOUS at 10:12

## 2024-12-17 RX ADMIN — OXYCODONE HYDROCHLORIDE AND ACETAMINOPHEN 1 TABLET: 5; 325 TABLET ORAL at 09:12

## 2024-12-17 RX ADMIN — EPHEDRINE SULFATE 5 MG: 50 INJECTION INTRAVENOUS at 10:12

## 2024-12-17 RX ADMIN — PHENYLEPHRINE HYDROCHLORIDE 200 MCG: 10 INJECTION INTRAVENOUS at 10:12

## 2024-12-17 RX ADMIN — PHENYLEPHRINE HYDROCHLORIDE 100 MCG: 10 INJECTION INTRAVENOUS at 12:12

## 2024-12-17 RX ADMIN — SODIUM CHLORIDE: 9 INJECTION, SOLUTION INTRAVENOUS at 05:12

## 2024-12-17 RX ADMIN — ONDANSETRON 4 MG: 2 INJECTION INTRAMUSCULAR; INTRAVENOUS at 01:12

## 2024-12-17 RX ADMIN — ENOXAPARIN SODIUM 40 MG: 40 INJECTION SUBCUTANEOUS at 08:12

## 2024-12-17 RX ADMIN — KETOROLAC TROMETHAMINE 15 MG: 30 INJECTION, SOLUTION INTRAMUSCULAR at 06:12

## 2024-12-17 RX ADMIN — PHENYLEPHRINE HYDROCHLORIDE 100 MCG: 10 INJECTION INTRAVENOUS at 11:12

## 2024-12-17 RX ADMIN — FENTANYL CITRATE 25 MCG: 50 INJECTION, SOLUTION INTRAMUSCULAR; INTRAVENOUS at 11:12

## 2024-12-17 RX ADMIN — CELECOXIB 400 MG: 200 CAPSULE ORAL at 08:12

## 2024-12-17 RX ADMIN — CEFTRIAXONE SODIUM 2 G: 2 INJECTION, POWDER, FOR SOLUTION INTRAMUSCULAR; INTRAVENOUS at 10:12

## 2024-12-17 RX ADMIN — ROCURONIUM BROMIDE 20 MG: 10 SOLUTION INTRAVENOUS at 10:12

## 2024-12-17 RX ADMIN — DEXAMETHASONE SODIUM PHOSPHATE 4 MG: 4 INJECTION, SOLUTION INTRA-ARTICULAR; INTRALESIONAL; INTRAMUSCULAR; INTRAVENOUS; SOFT TISSUE at 10:12

## 2024-12-17 RX ADMIN — SUGAMMADEX 200 MG: 100 INJECTION, SOLUTION INTRAVENOUS at 01:12

## 2024-12-17 RX ADMIN — FENTANYL CITRATE 25 MCG: 50 INJECTION, SOLUTION INTRAMUSCULAR; INTRAVENOUS at 12:12

## 2024-12-17 RX ADMIN — HYDROMORPHONE HYDROCHLORIDE 0.2 MG: 2 INJECTION, SOLUTION INTRAMUSCULAR; INTRAVENOUS; SUBCUTANEOUS at 02:12

## 2024-12-17 RX ADMIN — SODIUM CHLORIDE, SODIUM GLUCONATE, SODIUM ACETATE, POTASSIUM CHLORIDE AND MAGNESIUM CHLORIDE: 526; 502; 368; 37; 30 INJECTION, SOLUTION INTRAVENOUS at 10:12

## 2024-12-17 NOTE — OP NOTE
[facility] - Periop Services  Operative Note      Date of Procedure: 12/17/2024     Procedure: Procedure(s) (LRB):  COLECTOMY, SIGMOID, LAPAROSCOPIC (N/A)     Surgeons and Role:     * Alejandro Hood MD - Primary    Assistant: Mary Cortez NP    Pre-Operative Diagnosis: Diverticulitis [K57.92]    Post-Operative Diagnosis: Same    Anesthesia: General    Estimated Blood Loss (EBL): 50 mL           Specimens: Sigmoid colon and donuts     Description of Technical Procedures: After informed consent was obtained, patient was brought to the operating room and placed in the supine position.  Next general endotracheal anesthesia was administered by member of the anesthesia team.  Patient was placed in lithotomy position using Samuel stirrups.  The abdomen was prepped and draped in sterile surgical fashion.  A small vertical infraumbilical incision was made with a 15 blade.  Access to the peritoneal cavity was achieved with an 11 mm Optiview trocar and a 10 mm 0 degree laparoscopic.  Bilateral tap blocks were performed using Marcaine and Exparel solution.  There were small-bowel adhesions to previously placed hernia mesh in the lower abdomen.  Two left-sided working trocars were placed and sharp adhesiolysis was performed with endo Ella.  The loops of bowel were closely inspected and no serosal defects or enterotomies were noted.  Two additional right-sided working trocars were then placed and we switched back to an angled 10 mm laparoscope through the infraumbilical trocar.  The patient was placed in Trendelenburg right-side down position.  Small bowel loops were delivered out of the pelvis.  The sigmoid colon mobilized laterally by dividing the peritoneal attachment working cephalad along the white line of Toldt.  The left ureter was identified and kept out of harm's way.  The left colon was elevated off of the retroperitoneum up to the splenic flexure.  The sigmoid colon was retracted towards the  anterior abdominal wall thus delineating the sigmoid vascular pedicle.  The pedicle was skeletonized, isolated, and divided with a 45 mm Birney stapler using a white cartridge.  A distal transection point was identified in the distal sigmoid colon.  The colon was divided at this level using the same Birney stapler with a cream cartridge.  Remaining mesentery was divided with Harmonic scalpel.  The proximal staple line was grasped with a laparoscopic grasper.  The infraumbilical incision was extended inferiorly a short distance with a 15 blade.  Incision was deepened electrocautery.  A small Arpan wound edge protector was utilized.  Sigmoid colon was then easily delivered through this wound.  A proximal transection point was identified and a 2-0 Prolene pursestring suture was placed using the pursestring clamp.  The colon was sharply transected with curved Meyer scissors.  Sigmoid colon was passed off table as surgical specimen.  A 29 mm EEA anvil was placed in the proximal colon secured with the Prolene suture.  Colon was returned to an intra-abdominal position.  Pneumoperitoneum was reachieved.  EEA sizers were easily advanced to the top of the distal stump.  The 29 mm EEA stapler was introduced into the rectum and advanced to the top of the rectal stump with intra-abdominal guidance.  Center bhargavi was deployed posterior to the staple line.  The anvil was attached and the stapler was closed.  Proper orientation of the colon and mesentery was confirmed.  The stapler was fired, opened, and removed without difficulty.  Two intact donut rings were noted.  Pelvis was filled with saline and the colon was occluded proximal to the anastomosis.  Proctoscope was inserted into the rectum and gently insufflated.  The anastomosis was pneumostatic.  Rectum was decompressed and the proctoscope was removed.  [place] seal was applied to the anastomosis.  Patient was returned to the neutral position and bowels were covered with  omentum.  The 12 mm right lower quadrant trocar fascial defect was repaired with a 0 Vicryl suture.  Pneumoperitoneum was released and working trocars were removed.  The Arpan was removed and we changed gloves.  The midline fascial defect was repaired with running looped PDS suture.  All wounds were irrigated and the skin edges reapproximated with Monocryl suture in a subcuticular fashion.  Incisions were cleaned and sterile bandages applied.  Patient tolerated the procedure well and there were no complications.  Patient was returned to the supine position.   Patient was awaken and extubated in the operating room then subsequently transferred to Recovery in satisfactory condition.  Mary Cortez was present for the entirety of the procedure and was critical in retraction and proper dissection.  There was no qualified resident available for assisting during this procedure.  This case had additional complexity and required an increased amount of time to complete due to the patient's morbid obesity.

## 2024-12-17 NOTE — ANESTHESIA PREPROCEDURE EVALUATION
12/17/2024  Chapis Brar is a 69 y.o., female.      Chapis Brar    Pre-op Diagnosis: Diverticulitis [K57.92]    Procedure(s):  COLECTOMY, LEFT, LAPAROSCOPIC     Review of patient's allergies indicates:  No Known Allergies    Current Outpatient Medications   Medication Instructions    albuterol (PROVENTIL/VENTOLIN HFA) 90 mcg/actuation inhaler 2 puffs, Inhalation, Every 4 hours PRN    ALPRAZolam (XANAX) 0.5 mg, Oral, Daily PRN    amLODIPine (NORVASC) 5 mg, Oral, Daily    amoxicillin-clavulanate 875-125mg (AUGMENTIN) 875-125 mg per tablet 1 tablet, Oral, 2 times daily    aspirin (ECOTRIN) 81 mg, Oral, Daily    atorvastatin (LIPITOR) 20 mg, Oral, Nightly    biotin 10,000 mcg Cap 1 capsule, Oral, Daily    calcium citrate/vitamin D3 (CALCIUM CITRATE + D ORAL) 1 tablet, Oral, 2 times daily    cholecalciferol, vitamin D3, (VITAMIN D3 ORAL) 2,000 Units, Oral, Daily    DULoxetine (CYMBALTA) 60 mg, Oral, Daily    Lactobacillus acidophilus (PROBIOTIC ORAL) 1 capsule, Oral, Daily    levothyroxine (SYNTHROID, LEVOTHROID) 175 mcg, Oral, Daily    metFORMIN (GLUCOPHAGE) 500 mg, Oral, 2 times daily    metroNIDAZOLE (FLAGYL) 500 mg, Oral, 3 times daily    MOUNJARO 12.5 mg, Subcutaneous, Every 7 days, Mondays, but took it today she for got to take it yesterday     nabumetone (RELAFEN) 750 mg, Oral, 2 times daily    neomycin (MYCIFRADIN) 1,000 mg, Oral, 3 times daily    NON FORMULARY MEDICATION 1 tablet, Oral, Daily, Vision formula eye vitamin    NON FORMULARY MEDICATION 1 tablet, Oral, Daily, NERVIVE    pantoprazole (PROTONIX) 40 mg, Daily    pramipexole (MIRAPEX) 0.5 mg, Nightly    psyllium husk (METAMUCIL ORAL) 1 capsule, Oral, Daily    terbinafine HCL (LAMISIL) 250 mg, Oral    traMADoL (ULTRAM) 50 mg, Every 6 hours PRN    traZODone (DESYREL) 50 mg, Nightly    valsartan-hydrochlorothiazide (DIOVAN-HCT) 160-25 mg per tablet  1 tablet, Oral, Daily    zinc gluconate 50 mg, Oral, Daily   LAST MOUNJARO 12/3/2024    AZ LAP,SURG,COLECTOMY, PARTIAL, W/ANAST [[id number]] (COLECTOMY,*    Past Medical History:   Diagnosis Date    Abdominal pain     Anxiety     Arthritis     Asthenia     Chronic back pain     Depression     Diabetes mellitus     Diverticulitis     GERD (gastroesophageal reflux disease)     Hiatal hernia     Hypertension     Kidney stones     Obesity     Osteoarthritis     Restless leg syndrome     Sleep apnea, unspecified     cpap    Thyroid disease     Vitamin D deficiency    PMH includes h/o Bronchitis 6 months ago, h/o mild JOHN that recovered    Past Surgical History:   Procedure Laterality Date    CHOLECYSTECTOMY      COLONOSCOPY      DILATION AND CURETTAGE OF UTERUS      EXCISIONAL HEMORRHOIDECTOMY      HYSTERECTOMY      INGUINAL HERNIA REPAIR Right     KNEE SURGERY Bilateral     recall    LAPAROSCOPIC REPAIR OF VENTRAL HERNIA  01/20/2017    Dr. Seth Hogan    LAPAROSCOPIC SLEEVE GASTRECTOMY  09/08/2015    Dr. Yepez    TOTAL KNEE ARTHROPLASTY Bilateral     WISDOM TOOTH EXTRACTION       Lab Results   Component Value Date    WBC 9.62 12/10/2024    HGB 11.5 (L) 12/10/2024    HCT 35.7 (L) 12/10/2024    MCV 93.9 12/10/2024     12/10/2024          BMP  Lab Results   Component Value Date     12/10/2024    K 4.1 12/10/2024     12/10/2024    CO2 29 12/10/2024    BUN 25.7 (H) 12/10/2024    CREATININE 0.89 12/10/2024    CALCIUM 9.5 12/10/2024    EGFRNONAA >60 03/01/2020                   Pre-op Assessment    I have reviewed the Patient Summary Reports.    I have reviewed the NPO Status.   I have reviewed the Medications.     Review of Systems  Anesthesia Hx:  No problems with previous Anesthesia             Denies Family Hx of Anesthesia complications.    Denies Personal Hx of Anesthesia complications.                    Social:  Non-Smoker       Cardiovascular:  Exercise tolerance: good   Hypertension       Denies  Angina.   Denies Orthopnea.  Denies PND.    Denies GONZALES.      Functional Capacity good / => 4 METS                         Pulmonary:        Sleep Apnea, CPAP                Renal/:  Chronic Renal Disease (h/o mild JOHN in past that resolved) renal calculi               Hepatic/GI:    Hiatal Hernia, GERD                Musculoskeletal:  Arthritis               Neurological:  Denies TIA.  Denies CVA.        RLS                            Endocrine:  Diabetes         Morbid Obesity / BMI > 40  Psych:   anxiety depression                Physical Exam  General: Well nourished, Alert and Oriented    Airway:  Mallampati: III   Mouth Opening: Normal  TM Distance: Normal  Tongue: Normal  Neck ROM: Normal ROM    Dental:  Intact    Chest/Lungs:  Clear to auscultation    Heart:  Rate: Normal  Rhythm: Regular Rhythm  No pretibial edema  No carotid bruits      Anesthesia Plan  Type of Anesthesia, risks & benefits discussed:    Anesthesia Type: Gen ETT  Intra-op Monitoring Plan: Standard ASA Monitors  Post Op Pain Control Plan: multimodal analgesia  Induction:  IV  Airway Plan: Direct, Post-Induction  Informed Consent: Informed consent signed with the Patient and all parties understand the risks and agree with anesthesia plan.  All questions answered. Patient consented to blood products? Yes  ASA Score: 3  Day of Surgery Review of History & Physical: H&P Update referred to the surgeon/provider.    Ready For Surgery From Anesthesia Perspective.     .

## 2024-12-17 NOTE — INTERVAL H&P NOTE
The patient has been examined and the H&P has been reviewed:    I concur with the findings and no changes have occurred since H&P was written.    Surgery risks, benefits and alternative options discussed and understood by patient/family.          Active Hospital Problems    Diagnosis  POA    *Diverticulitis large intestine w/o perforation or abscess w/o bleeding [K57.32]  No      Resolved Hospital Problems   No resolved problems to display.

## 2024-12-17 NOTE — ANESTHESIA PROCEDURE NOTES
Intubation    Date/Time: 12/17/2024 10:18 AM    Performed by: Deepali Almazan CRNA  Authorized by: Angel Wang MD    Intubation:     Induction:  Intravenous    Intubated:  Postinduction    Mask Ventilation:  Moderately difficult with oral airway    Attempts:  1    Attempted By:  Student    Method of Intubation:  Direct    Blade:  Long 2    Laryngeal View Grade: Grade IIA - cords partially seen      Difficult Airway Encountered?: No      Complications:  None    Airway Device:  Oral endotracheal tube    Airway Device Size:  7.0    Style/Cuff Inflation:  Cuffed (inflated to minimal occlusive pressure)    Inflation Amount (mL):  6    Tube secured:  22    Secured at:  The lips    Placement Verified By:  Capnometry    Complicating Factors:  Short neck and obesity    Findings Post-Intubation:  BS equal bilateral and atraumatic/condition of teeth unchanged

## 2024-12-17 NOTE — TRANSFER OF CARE
"Anesthesia Transfer of Care Note    Patient: Chapis Brar    Procedure(s) Performed: Procedure(s) (LRB):  COLECTOMY, SIGMOID, LAPAROSCOPIC (N/A)    Patient location: PACU    Anesthesia Type: general    Transport from OR: Transported from OR on 2-3 L/min O2 by NC with adequate spontaneous ventilation    Post pain: adequate analgesia    Post assessment: no apparent anesthetic complications    Post vital signs: stable    Level of consciousness: awake, alert and oriented    Nausea/Vomiting: no nausea/vomiting    Complications: none    Transfer of care protocol was followed      Last vitals: Visit Vitals  BP (!) 99/40 (BP Location: Right forearm, Patient Position: Sitting)   Pulse 98   Temp 36.5 °C (97.7 °F) (Skin)   Resp 18   Ht 5' 4" (1.626 m)   Wt 124.7 kg (274 lb 14.6 oz)   SpO2 95%   Breastfeeding No   BMI 47.19 kg/m²     "

## 2024-12-18 LAB
ANION GAP SERPL CALC-SCNC: 8 MEQ/L
BASOPHILS # BLD AUTO: 0.05 X10(3)/MCL
BASOPHILS NFR BLD AUTO: 0.5 %
BUN SERPL-MCNC: 18.4 MG/DL (ref 9.8–20.1)
CALCIUM SERPL-MCNC: 8.3 MG/DL (ref 8.4–10.2)
CHLORIDE SERPL-SCNC: 108 MMOL/L (ref 98–107)
CO2 SERPL-SCNC: 21 MMOL/L (ref 23–31)
CREAT SERPL-MCNC: 0.84 MG/DL (ref 0.55–1.02)
CREAT/UREA NIT SERPL: 22
EOSINOPHIL # BLD AUTO: 0.14 X10(3)/MCL (ref 0–0.9)
EOSINOPHIL NFR BLD AUTO: 1.4 %
ERYTHROCYTE [DISTWIDTH] IN BLOOD BY AUTOMATED COUNT: 13.8 % (ref 11.5–17)
GFR SERPLBLD CREATININE-BSD FMLA CKD-EPI: >60 ML/MIN/1.73/M2
GLUCOSE SERPL-MCNC: 102 MG/DL (ref 82–115)
HCT VFR BLD AUTO: 31 % (ref 37–47)
HGB BLD-MCNC: 9.7 G/DL (ref 12–16)
IMM GRANULOCYTES # BLD AUTO: 0.05 X10(3)/MCL (ref 0–0.04)
IMM GRANULOCYTES NFR BLD AUTO: 0.5 %
LYMPHOCYTES # BLD AUTO: 1.3 X10(3)/MCL (ref 0.6–4.6)
LYMPHOCYTES NFR BLD AUTO: 12.7 %
MCH RBC QN AUTO: 29.7 PG (ref 27–31)
MCHC RBC AUTO-ENTMCNC: 31.3 G/DL (ref 33–36)
MCV RBC AUTO: 94.8 FL (ref 80–94)
MONOCYTES # BLD AUTO: 1.17 X10(3)/MCL (ref 0.1–1.3)
MONOCYTES NFR BLD AUTO: 11.4 %
NEUTROPHILS # BLD AUTO: 7.51 X10(3)/MCL (ref 2.1–9.2)
NEUTROPHILS NFR BLD AUTO: 73.5 %
NRBC BLD AUTO-RTO: 0 %
PLATELET # BLD AUTO: 231 X10(3)/MCL (ref 130–400)
PMV BLD AUTO: 8.6 FL (ref 7.4–10.4)
POCT GLUCOSE: 102 MG/DL (ref 70–110)
POCT GLUCOSE: 122 MG/DL (ref 70–110)
POCT GLUCOSE: 132 MG/DL (ref 70–110)
POCT GLUCOSE: 95 MG/DL (ref 70–110)
POTASSIUM SERPL-SCNC: 4.2 MMOL/L (ref 3.5–5.1)
RBC # BLD AUTO: 3.27 X10(6)/MCL (ref 4.2–5.4)
SODIUM SERPL-SCNC: 137 MMOL/L (ref 136–145)
WBC # BLD AUTO: 10.22 X10(3)/MCL (ref 4.5–11.5)

## 2024-12-18 PROCEDURE — 99900035 HC TECH TIME PER 15 MIN (STAT)

## 2024-12-18 PROCEDURE — 36415 COLL VENOUS BLD VENIPUNCTURE: CPT

## 2024-12-18 PROCEDURE — 25000003 PHARM REV CODE 250

## 2024-12-18 PROCEDURE — 99900031 HC PATIENT EDUCATION (STAT)

## 2024-12-18 PROCEDURE — 94799 UNLISTED PULMONARY SVC/PX: CPT

## 2024-12-18 PROCEDURE — 85025 COMPLETE CBC W/AUTO DIFF WBC: CPT

## 2024-12-18 PROCEDURE — 25000003 PHARM REV CODE 250: Performed by: COLON & RECTAL SURGERY

## 2024-12-18 PROCEDURE — 80048 BASIC METABOLIC PNL TOTAL CA: CPT

## 2024-12-18 PROCEDURE — 11000001 HC ACUTE MED/SURG PRIVATE ROOM

## 2024-12-18 PROCEDURE — 63600175 PHARM REV CODE 636 W HCPCS

## 2024-12-18 RX ADMIN — OXYCODONE HYDROCHLORIDE AND ACETAMINOPHEN 1 TABLET: 5; 325 TABLET ORAL at 08:12

## 2024-12-18 RX ADMIN — KETOROLAC TROMETHAMINE 15 MG: 30 INJECTION, SOLUTION INTRAMUSCULAR at 12:12

## 2024-12-18 RX ADMIN — SODIUM CHLORIDE: 9 INJECTION, SOLUTION INTRAVENOUS at 09:12

## 2024-12-18 RX ADMIN — PANTOPRAZOLE SODIUM 40 MG: 40 TABLET, DELAYED RELEASE ORAL at 09:12

## 2024-12-18 RX ADMIN — LEVOTHYROXINE SODIUM 175 MCG: 150 TABLET ORAL at 09:12

## 2024-12-18 RX ADMIN — KETOROLAC TROMETHAMINE 15 MG: 30 INJECTION, SOLUTION INTRAMUSCULAR at 05:12

## 2024-12-18 RX ADMIN — OXYCODONE HYDROCHLORIDE AND ACETAMINOPHEN 1 TABLET: 5; 325 TABLET ORAL at 09:12

## 2024-12-18 RX ADMIN — MUPIROCIN: 20 OINTMENT TOPICAL at 08:12

## 2024-12-18 RX ADMIN — DULOXETINE HYDROCHLORIDE 60 MG: 30 CAPSULE, DELAYED RELEASE ORAL at 09:12

## 2024-12-18 RX ADMIN — KETOROLAC TROMETHAMINE 15 MG: 30 INJECTION, SOLUTION INTRAMUSCULAR at 01:12

## 2024-12-18 RX ADMIN — SODIUM CHLORIDE: 9 INJECTION, SOLUTION INTRAVENOUS at 08:12

## 2024-12-18 RX ADMIN — KETOROLAC TROMETHAMINE 15 MG: 30 INJECTION, SOLUTION INTRAMUSCULAR at 06:12

## 2024-12-18 NOTE — PLAN OF CARE
Problem: Adult Inpatient Plan of Care  Goal: Plan of Care Review  Outcome: Met  Goal: Patient-Specific Goal (Individualized)  Outcome: Met  Goal: Absence of Hospital-Acquired Illness or Injury  Outcome: Met  Goal: Optimal Comfort and Wellbeing  Outcome: Met  Goal: Readiness for Transition of Care  Outcome: Met     Problem: Adult Inpatient Plan of Care  Goal: Plan of Care Review  Outcome: Progressing  Goal: Patient-Specific Goal (Individualized)  Outcome: Progressing  Goal: Absence of Hospital-Acquired Illness or Injury  Outcome: Progressing  Goal: Optimal Comfort and Wellbeing  Outcome: Progressing  Goal: Readiness for Transition of Care  Outcome: Progressing     Problem: Bariatric Environmental Safety  Goal: Safety Maintained with Care  Outcome: Met     Problem: Bariatric Environmental Safety  Goal: Safety Maintained with Care  Outcome: Progressing     Problem: Infection  Goal: Absence of Infection Signs and Symptoms  Outcome: Met     Problem: Infection  Goal: Absence of Infection Signs and Symptoms  Outcome: Progressing     Problem: Wound  Goal: Optimal Coping  Outcome: Met  Goal: Optimal Functional Ability  Outcome: Met  Goal: Absence of Infection Signs and Symptoms  Outcome: Met  Goal: Improved Oral Intake  Outcome: Met  Goal: Optimal Pain Control and Function  Outcome: Met  Goal: Skin Health and Integrity  Outcome: Met  Goal: Optimal Wound Healing  Outcome: Met     Problem: Wound  Goal: Optimal Coping  Outcome: Progressing  Goal: Optimal Functional Ability  Outcome: Progressing  Goal: Absence of Infection Signs and Symptoms  Outcome: Progressing  Goal: Improved Oral Intake  Outcome: Progressing  Goal: Optimal Pain Control and Function  Outcome: Progressing  Goal: Skin Health and Integrity  Outcome: Progressing  Goal: Optimal Wound Healing  Outcome: Progressing     Problem: Fall Injury Risk  Goal: Absence of Fall and Fall-Related Injury  Outcome: Met

## 2024-12-18 NOTE — PROGRESS NOTES
Colon & Rectal Surgery Progress Note    Post Op Day 1     Subjective:    Tolerating diet  Denies nausea/vomiting or abdominal pain  Denies flatus or BM  Labs stable  Urine clear with 480 ml output  VS stable    Objective:  Temp:  [97.6 °F (36.4 °C)-98.6 °F (37 °C)]   Pulse:  [76-98]   Resp:  [13-40]   BP: ()/(40-90)   SpO2:  [92 %-100 %]     Physical Exam:  NAD  Regular rate and rhythm  Non-labored respirations  Abdomen soft, appropriate, non distended, incisions clean and dry  SCDs in place      Intake/Output Summary (Last 24 hours) at 12/18/2024 1317  Last data filed at 12/18/2024 1130  Gross per 24 hour   Intake 2347.62 ml   Output 1100 ml   Net 1247.62 ml       Recent Labs     12/18/24  0534 12/18/24  1030   WBC  --  10.22   HGB  --  9.7*   HCT  --  31.0*   PLT  --  231     --    K 4.2  --    *  --    CO2 21*  --    BUN 18.4  --    CREATININE 0.84  --    CALCIUM 8.3*  --        Assessment/Plan    - Ambulate  - DC Jacobs  - Continue IVF for hydration  - Continue diet, will advance tonight if tolerates lunch         KATHY Hood  Colon & Rectal Surgery  [facility] - 8th Floor Med Surg

## 2024-12-18 NOTE — ANESTHESIA POSTPROCEDURE EVALUATION
Anesthesia Post Evaluation    Patient: Chapis Brar    Procedure(s) Performed: Procedure(s) (LRB):  COLECTOMY, SIGMOID, LAPAROSCOPIC (N/A)    Final Anesthesia Type: general      Patient location during evaluation: PACU  Patient participation: Yes- Able to Participate  Level of consciousness: awake and alert and oriented  Post-procedure vital signs: reviewed and stable  Pain management: adequate  Airway patency: patent    PONV status at discharge: No PONV  Anesthetic complications: no      Cardiovascular status: hemodynamically stable  Respiratory status: unassisted  Hydration status: euvolemic  Follow-up not needed.              Vitals Value Taken Time   /61 12/17/24 1722   Temp 36.5 °C (97.7 °F) 12/17/24 1404   Pulse 87 12/17/24 1722   Resp 12 12/17/24 1722   SpO2 98 % 12/17/24 1722   Vitals shown include unfiled device data.      Event Time   Out of Recovery 17:30:00         Pain/Pieter Score: Pain Rating Prior to Med Admin: 7 (12/17/2024  2:38 PM)  Pieter Score: 9 (12/17/2024  5:30 PM)

## 2024-12-19 VITALS
BODY MASS INDEX: 46.94 KG/M2 | OXYGEN SATURATION: 96 % | WEIGHT: 274.94 LBS | HEIGHT: 64 IN | RESPIRATION RATE: 18 BRPM | TEMPERATURE: 98 F | SYSTOLIC BLOOD PRESSURE: 110 MMHG | DIASTOLIC BLOOD PRESSURE: 68 MMHG | HEART RATE: 99 BPM

## 2024-12-19 PROBLEM — K57.32 DIVERTICULITIS LARGE INTESTINE W/O PERFORATION OR ABSCESS W/O BLEEDING: Status: RESOLVED | Noted: 2024-12-17 | Resolved: 2024-12-19

## 2024-12-19 LAB
POCT GLUCOSE: 113 MG/DL (ref 70–110)
POCT GLUCOSE: 128 MG/DL (ref 70–110)
PSYCHE PATHOLOGY RESULT: NORMAL

## 2024-12-19 PROCEDURE — 63600175 PHARM REV CODE 636 W HCPCS

## 2024-12-19 PROCEDURE — 99900035 HC TECH TIME PER 15 MIN (STAT)

## 2024-12-19 PROCEDURE — 25000003 PHARM REV CODE 250

## 2024-12-19 PROCEDURE — 94799 UNLISTED PULMONARY SVC/PX: CPT

## 2024-12-19 PROCEDURE — 99900031 HC PATIENT EDUCATION (STAT)

## 2024-12-19 RX ORDER — HYDROCODONE BITARTRATE AND ACETAMINOPHEN 5; 325 MG/1; MG/1
1 TABLET ORAL EVERY 6 HOURS PRN
Qty: 30 TABLET | Refills: 0 | Status: SHIPPED | OUTPATIENT
Start: 2024-12-19

## 2024-12-19 RX ADMIN — MUPIROCIN: 20 OINTMENT TOPICAL at 09:12

## 2024-12-19 RX ADMIN — PANTOPRAZOLE SODIUM 40 MG: 40 TABLET, DELAYED RELEASE ORAL at 09:12

## 2024-12-19 RX ADMIN — KETOROLAC TROMETHAMINE 15 MG: 30 INJECTION, SOLUTION INTRAMUSCULAR at 12:12

## 2024-12-19 RX ADMIN — DULOXETINE HYDROCHLORIDE 60 MG: 30 CAPSULE, DELAYED RELEASE ORAL at 09:12

## 2024-12-19 RX ADMIN — LEVOTHYROXINE SODIUM 175 MCG: 150 TABLET ORAL at 09:12

## 2024-12-19 RX ADMIN — KETOROLAC TROMETHAMINE 15 MG: 30 INJECTION, SOLUTION INTRAMUSCULAR at 05:12

## 2024-12-19 RX ADMIN — OXYCODONE HYDROCHLORIDE AND ACETAMINOPHEN 1 TABLET: 5; 325 TABLET ORAL at 01:12

## 2024-12-19 NOTE — PLAN OF CARE
Patient was given and explained discharge instructions with  at bedside.  Pt has no questions or concerns. Pt left floor via wheelchair at  1345.  Problem: Adult Inpatient Plan of Care  Goal: Plan of Care Review  12/19/2024 1438 by Erica Antonio RN  Outcome: Met  12/19/2024 1437 by Erica Antonio RN  Outcome: Progressing  Goal: Patient-Specific Goal (Individualized)  12/19/2024 1438 by Erica Antonio RN  Outcome: Met  12/19/2024 1437 by Erica Antonio RN  Outcome: Progressing  Goal: Absence of Hospital-Acquired Illness or Injury  12/19/2024 1438 by Erica Antonio RN  Outcome: Met  12/19/2024 1437 by Erica Antonio RN  Outcome: Progressing  Goal: Optimal Comfort and Wellbeing  12/19/2024 1438 by Erica Antonio RN  Outcome: Met  12/19/2024 1437 by Erica Antonio RN  Outcome: Progressing  Goal: Readiness for Transition of Care  12/19/2024 1438 by Erica Antonio RN  Outcome: Met  12/19/2024 1437 by Erica Antonio RN  Outcome: Progressing     Problem: Bariatric Environmental Safety  Goal: Safety Maintained with Care  12/19/2024 1438 by Erica Antonio RN  Outcome: Met  12/19/2024 1437 by Erica Antonio RN  Outcome: Progressing     Problem: Infection  Goal: Absence of Infection Signs and Symptoms  12/19/2024 1438 by Erica Antonio RN  Outcome: Met  12/19/2024 1437 by Erica Antonio RN  Outcome: Progressing     Problem: Wound  Goal: Optimal Coping  12/19/2024 1438 by Erica Antonio RN  Outcome: Met  12/19/2024 1437 by Erica Antonio RN  Outcome: Progressing  Goal: Optimal Functional Ability  12/19/2024 1438 by Erica Antonio RN  Outcome: Met  12/19/2024 1437 by Erica Antonio RN  Outcome: Progressing  Goal: Absence of Infection Signs and Symptoms  12/19/2024 1438 by Erica Antonio RN  Outcome: Met  12/19/2024 1437 by Erica Antonio, RN  Outcome: Progressing  Goal: Improved Oral Intake  12/19/2024 1438 by Erica Antonio, RN  Outcome: Met  12/19/2024 1437 by Erica Antonio, RN  Outcome:  Progressing  Goal: Optimal Pain Control and Function  12/19/2024 1438 by Erica Antonio RN  Outcome: Met  12/19/2024 1437 by Erica Antonio RN  Outcome: Progressing  Goal: Skin Health and Integrity  12/19/2024 1438 by Erica Antonio RN  Outcome: Met  12/19/2024 1437 by Erica Antonio RN  Outcome: Progressing  Goal: Optimal Wound Healing  12/19/2024 1438 by Erica Antonio RN  Outcome: Met  12/19/2024 1437 by Erica Antonio, RN  Outcome: Progressing

## 2024-12-19 NOTE — PROGRESS NOTES
Colon & Rectal Surgery Progress Note    Post Op Day 2     Subjective:    Reports BM w/o stool last night, denies flatus  Tolerating diet  Ambulating  Reports abdominal soreness      Objective:  Temp:  [97.7 °F (36.5 °C)-98.7 °F (37.1 °C)]   Pulse:  [83-96]   Resp:  [16-20]   BP: ()/(48-72)   SpO2:  [92 %-95 %]     Physical Exam:  NAD  Regular rate and rhythm  Non-labored respirations  Abdomen soft, appropriate, non distended, incisions dry and intact  SCDs in place      Intake/Output Summary (Last 24 hours) at 12/19/2024 0837  Last data filed at 12/19/2024 0729  Gross per 24 hour   Intake 2964.15 ml   Output 1175 ml   Net 1789.15 ml       Recent Labs     12/18/24  0534 12/18/24  1030   WBC  --  10.22   HGB  --  9.7*   HCT  --  31.0*   PLT  --  231     --    K 4.2  --    *  --    CO2 21*  --    BUN 18.4  --    CREATININE 0.84  --    CALCIUM 8.3*  --        Assessment/Plan    - DC IVF  - Ambulate  - Pain control  - Will watch over day, may DC home if flatus occurs         KATHY Hood  Colon & Rectal Surgery  [facility] - 8th Floor Med Surg

## 2024-12-19 NOTE — PLAN OF CARE
12/19/24 1157   Final Note   Assessment Type Final Discharge Note   Anticipated Discharge Disposition Home   Hospital Resources/Appts/Education Provided Appointments scheduled and added to AVS   Post-Acute Status   Discharge Delays None known at this time     Pt being discharged home today. No dc needs at this time.

## 2024-12-20 ENCOUNTER — PATIENT OUTREACH (OUTPATIENT)
Dept: ADMINISTRATIVE | Facility: CLINIC | Age: 69
End: 2024-12-20
Payer: MEDICARE

## 2024-12-20 NOTE — PROGRESS NOTES
C3 nurse spoke with Chapis Brar for a TCC post hospital discharge follow up call. The patient stated she has a scheduled HOSFU appointment with Magno Enciso MD on 12/31/24 @ 8:00 and a post op appt with Alejandro Hood MD (Colon and Rectal Surgery) on 1/2/2025; @ 9:15 AM.

## 2024-12-23 ENCOUNTER — TELEPHONE (OUTPATIENT)
Dept: ADMINISTRATIVE | Facility: CLINIC | Age: 69
End: 2024-12-23
Payer: MEDICARE

## 2025-01-02 ENCOUNTER — OFFICE VISIT (OUTPATIENT)
Dept: SURGICAL ONCOLOGY | Facility: CLINIC | Age: 70
End: 2025-01-02
Payer: MEDICARE

## 2025-01-02 VITALS
HEIGHT: 64 IN | TEMPERATURE: 98 F | HEART RATE: 82 BPM | DIASTOLIC BLOOD PRESSURE: 69 MMHG | BODY MASS INDEX: 48.04 KG/M2 | OXYGEN SATURATION: 99 % | WEIGHT: 281.38 LBS | SYSTOLIC BLOOD PRESSURE: 103 MMHG

## 2025-01-02 DIAGNOSIS — Z90.49 S/P LAPAROSCOPIC COLECTOMY: Primary | ICD-10-CM

## 2025-01-02 PROCEDURE — 99215 OFFICE O/P EST HI 40 MIN: CPT | Mod: PBBFAC

## 2025-01-02 PROCEDURE — 99999 PR PBB SHADOW E&M-EST. PATIENT-LVL V: CPT | Mod: PBBFAC,,,

## 2025-01-02 NOTE — PROGRESS NOTES
Colorectal Surgery  Patient ID: [id number]     HPI:     Chapis Brar is a 69 y.o. female here today for a post op visit after having Laparoscopic sigmoid colectomy without complication on 12/17/24. Pathology revealed Diverticulitis. She reports regular daily bowel movements that are soft w/ occasional loose bowels. Denies blood in stool, abdominal pain, nausea, or vomiting.     Current Outpatient Medications   Medication Instructions    albuterol (PROVENTIL/VENTOLIN HFA) 90 mcg/actuation inhaler 2 puffs, Every 4 hours PRN    ALPRAZolam (XANAX) 0.5 mg, Daily PRN    amLODIPine (NORVASC) 5 mg, Daily    amoxicillin-clavulanate 875-125mg (AUGMENTIN) 875-125 mg per tablet 1 tablet, 2 times daily    aspirin (ECOTRIN) 81 mg, Daily    atorvastatin (LIPITOR) 20 mg, Nightly    biotin 10,000 mcg Cap 1 capsule, Daily    calcium citrate/vitamin D3 (CALCIUM CITRATE + D ORAL) 1 tablet, 2 times daily    cholecalciferol, vitamin D3, (VITAMIN D3 ORAL) 2,000 Units, Daily    dicyclomine (BENTYL) 10 mg, 3 times daily    DULoxetine (CYMBALTA) 60 mg, Daily    HYDROcodone-acetaminophen (NORCO) 5-325 mg per tablet 1 tablet, Oral, Every 6 hours PRN    Lactobacillus acidophilus (PROBIOTIC ORAL) 1 capsule, Daily    levothyroxine (SYNTHROID, LEVOTHROID) 175 mcg, Daily    metFORMIN (GLUCOPHAGE) 500 mg, 2 times daily    MOUNJARO 12.5 mg, Every 7 days    nabumetone (RELAFEN) 750 mg, 2 times daily    NON FORMULARY MEDICATION 1 tablet, Daily    NON FORMULARY MEDICATION 1 tablet, Daily    pantoprazole (PROTONIX) 40 mg, Daily    pramipexole (MIRAPEX) 0.5 mg, Nightly    psyllium husk (METAMUCIL ORAL) 1 capsule, Daily    terbinafine HCL (LAMISIL) 250 mg    traMADoL (ULTRAM) 50 mg, Every 6 hours PRN    traZODone (DESYREL) 50 mg, Nightly    valsartan-hydrochlorothiazide (DIOVAN-HCT) 160-25 mg per tablet 1 tablet, Daily    zinc gluconate 50 mg, Daily       Patient has No Known Allergies.     Patient Care Team:  Magno Enciso MD as PCP - General  "(Family Medicine)  Bebo Yepez MD as Surgeon (Bariatrics)  Scott rGimm MD (Gastroenterology)     Objective:     Visit Vitals  /69 (BP Location: Left arm, Patient Position: Sitting)   Pulse 82   Temp 97.8 °F (36.6 °C) (Oral)   Ht 5' 4" (1.626 m)   Wt 127.6 kg (281 lb 6.4 oz)   SpO2 99%   BMI 48.30 kg/m²       Physical Exam    General: Alert and oriented, No acute distress.  Head: Normocephalic, Atraumatic.  Eye: Sclera non-icteric.  Respiratory: Non-labored respirations, Symmetrical chest wall expansion.  Cardiac: Regular rate.  Gastrointestinal: Soft, Non-distended. LLQ incision pink with sanguinous drainage, lower midline incision pink with scant sanguinous drainage.    Extremities: No lower extremity edema.  Integumentary: Warm, Dry, Intact.  Neurologic: No focal deficits.    Assessment:       ICD-10-CM ICD-9-CM   1. S/P laparoscopic colectomy  Z90.49 V45.89        Plan:       1. S/P laparoscopic colectomy    - RTC in 2 weeks for incision check  - Okay to submerge/use lotions  - Continue lifting restrictions for 4 weeks    No follow-ups on file. In addition to their scheduled follow up, the patient has also been instructed to follow up on as needed basis.     No future appointments.       KATHY Hood    "

## 2025-01-09 ENCOUNTER — OFFICE VISIT (OUTPATIENT)
Dept: SURGICAL ONCOLOGY | Facility: CLINIC | Age: 70
End: 2025-01-09
Payer: MEDICARE

## 2025-01-09 VITALS
SYSTOLIC BLOOD PRESSURE: 120 MMHG | BODY MASS INDEX: 47.23 KG/M2 | DIASTOLIC BLOOD PRESSURE: 72 MMHG | WEIGHT: 276.63 LBS | HEART RATE: 90 BPM | HEIGHT: 64 IN

## 2025-01-09 DIAGNOSIS — Z90.49 S/P LAPAROSCOPIC COLECTOMY: Primary | ICD-10-CM

## 2025-01-09 PROCEDURE — 99999 PR PBB SHADOW E&M-EST. PATIENT-LVL V: CPT | Mod: PBBFAC,,,

## 2025-01-09 PROCEDURE — 99215 OFFICE O/P EST HI 40 MIN: CPT | Mod: PBBFAC

## 2025-01-09 PROCEDURE — 99024 POSTOP FOLLOW-UP VISIT: CPT | Mod: POP,,,

## 2025-01-09 NOTE — PROGRESS NOTES
Colorectal Surgery  Patient ID: [id number]     HPI:     Chapis Brar is a 70 y.o. female here today for a post op visit after having a laparoscopic colectomy on 12/17/24. Pathology revealed diverticulitis. Today she reports no abdominal pain, normal soft stools with stool softener, denies nausea or vomiting. She reports having small amount of vaginal bleeding when she has a bowel movement that started last Friday. She saw her OBGYN, Dr. Head on Monday w/ no obvious sign of bleeding on exam. She denies stool or other types of drainage or pain when this occurs.     Current Outpatient Medications   Medication Instructions    albuterol (PROVENTIL/VENTOLIN HFA) 90 mcg/actuation inhaler 2 puffs, Every 4 hours PRN    ALPRAZolam (XANAX) 0.5 mg, Daily PRN    amLODIPine (NORVASC) 5 mg, Daily    amoxicillin-clavulanate 875-125mg (AUGMENTIN) 875-125 mg per tablet 1 tablet, 2 times daily    aspirin (ECOTRIN) 81 mg, Daily    atorvastatin (LIPITOR) 20 mg, Nightly    biotin 10,000 mcg Cap 1 capsule, Daily    calcium citrate/vitamin D3 (CALCIUM CITRATE + D ORAL) 1 tablet, 2 times daily    cholecalciferol, vitamin D3, (VITAMIN D3 ORAL) 2,000 Units, Daily    dicyclomine (BENTYL) 10 mg, 3 times daily    DULoxetine (CYMBALTA) 60 mg, Daily    HYDROcodone-acetaminophen (NORCO) 5-325 mg per tablet 1 tablet, Oral, Every 6 hours PRN    Lactobacillus acidophilus (PROBIOTIC ORAL) 1 capsule, Daily    levothyroxine (SYNTHROID, LEVOTHROID) 175 mcg, Daily    metFORMIN (GLUCOPHAGE) 500 mg, 2 times daily    MOUNJARO 12.5 mg, Every 7 days    nabumetone (RELAFEN) 750 mg, 2 times daily    NON FORMULARY MEDICATION 1 tablet, Daily    NON FORMULARY MEDICATION 1 tablet, Daily    pantoprazole (PROTONIX) 40 mg, Daily    pramipexole (MIRAPEX) 0.5 mg, Nightly    psyllium husk (METAMUCIL ORAL) 1 capsule, Daily    terbinafine HCL (LAMISIL) 250 mg    traMADoL (ULTRAM) 50 mg, Every 6 hours PRN    traZODone (DESYREL) 50 mg, Nightly     "valsartan-hydrochlorothiazide (DIOVAN-HCT) 160-25 mg per tablet 1 tablet, Daily    zinc gluconate 50 mg, Daily       Patient has No Known Allergies.     Patient Care Team:  Magno Enciso MD as PCP - General (Family Medicine)  Bebo Yepez MD as Surgeon (Bariatrics)  Scott Grimm MD (Gastroenterology)     Objective:     Visit Vitals  /72 (BP Location: Left arm, Patient Position: Sitting)   Pulse 90   Ht 5' 4" (1.626 m)   Wt 125.5 kg (276 lb 9.6 oz)   SpO2 (P) 99%   BMI 47.48 kg/m²       Physical Exam    General: Alert and oriented, No acute distress.  Head: Normocephalic, Atraumatic.  Eye: Sclera non-icteric.  Respiratory: Non-labored respirations, Symmetrical chest wall expansion.  Cardiac: Regular rate.  Gastrointestinal: Soft, Non-distended. Incisions healed.   Extremities: No lower extremity edema.  Integumentary: Warm, Dry, Intact.  Neurologic: No focal deficits.    Assessment:       ICD-10-CM ICD-9-CM   1. S/P laparoscopic colectomy  Z90.49 V45.89        Plan:       1. S/P laparoscopic colectomy    - RTC in 2 weeks to evaluate if bleeding is still occurring  - Will evaluate for possible scan if bleeding does not stop    No follow-ups on file. In addition to their scheduled follow up, the patient has also been instructed to follow up on as needed basis.     Future Appointments   Date Time Provider Department Center   1/23/2025  9:30 AM Mary Cortez FNP [facility] [address] [facility]        KATHY Hood    "

## (undated) DEVICE — JELLY SURGILUBE LUBE TUBE 2OZ

## (undated) DEVICE — CONTAINER SPECIMEN SCREW 4OZ

## (undated) DEVICE — Device

## (undated) DEVICE — SUT 3-0 MONOCRYL PLUS PS-2

## (undated) DEVICE — STRIP MEDI WND CLSR 1/2X4IN

## (undated) DEVICE — SUT PROLENE 2-0 KS BL MONO

## (undated) DEVICE — PAD PREP CUFFED NS 24X48IN

## (undated) DEVICE — STAPLER ECHELON PWR CIR 29MM

## (undated) DEVICE — TROCAR ENDOPATH XCEL 11MM 10CM

## (undated) DEVICE — SEALANT VISTASEAL FIBRIN 10ML

## (undated) DEVICE — SPONGE LAP 18X18 PREWASHED

## (undated) DEVICE — SYR IRRIGATION BULB STER 60ML

## (undated) DEVICE — GLOVE PROTEXIS LTX MICRO 6.5

## (undated) DEVICE — SUT MFIL VIOL PDS II TP-1 30IN

## (undated) DEVICE — COVER PROBE US 5.5X58L NON LTX

## (undated) DEVICE — BULB BLADDER ASSEMBLY STRL

## (undated) DEVICE — SOL NACL IRR 1000ML BTL

## (undated) DEVICE — TRAY CATH 1-LYR URIMTR 16FR

## (undated) DEVICE — ELECTRODE PATIENT RETURN DISP

## (undated) DEVICE — TROCAR ENDOPATH XCEL 5X100MM

## (undated) DEVICE — DRAPE LEGGINGS CUFF 31X48IN

## (undated) DEVICE — SUT CTD VICRYL BR CR/SH VIL

## (undated) DEVICE — PAD PINK TRENDELENBURG POS XL

## (undated) DEVICE — SYR 10CC LUER LOCK

## (undated) DEVICE — COVER MAYO STND XL 30X57IN

## (undated) DEVICE — CHLORAPREP W TINT 26ML APPL

## (undated) DEVICE — KIT GEN LAPAROSCOPY LAFAYETTE

## (undated) DEVICE — GLOVE PROTEXIS LTX MICRO  7.5

## (undated) DEVICE — APPLICATOR VISTASEAL RIG 35CM

## (undated) DEVICE — GLOVE PROTEXIS BLUE LATEX 8

## (undated) DEVICE — KIT SURGICAL TURNOVER

## (undated) DEVICE — IRRIGATOR HYDRO-SURG PLUS 5MM

## (undated) DEVICE — RELOAD ECHELON ENDOPATH 45MM

## (undated) DEVICE — SPONGE X-RAY LAP DETCT 18X18IN

## (undated) DEVICE — STAPLER ECHELON LONG 45X440MM

## (undated) DEVICE — DRAPE UNDER BUTTOCKS SUC PORT

## (undated) DEVICE — DRAPE SLUSH WARMER 66X44IN

## (undated) DEVICE — HOLDER STRIP-T SELF ADH 2X10IN

## (undated) DEVICE — NDL 20GX1-1/2IN IB

## (undated) DEVICE — SHEARS HARMONIC CRVD TIP 36CM

## (undated) DEVICE — NDL GRANEE OPEN LOOP GRASPER

## (undated) DEVICE — GLOVE PROTEXIS BLUE LATEX 7

## (undated) DEVICE — DRAPE MEDIUM SHEET 40X70IN

## (undated) DEVICE — SOL CLEARIFY VISUALIZATION LAP

## (undated) DEVICE — SUT MCRYL PLUS 4-0 PS2 27IN

## (undated) DEVICE — CANNULA ENDOPATH XCEL 5X100MM

## (undated) DEVICE — BOWL STERILE LARGE 32OZ

## (undated) DEVICE — SOL IRRI STRL WATER 1000ML

## (undated) DEVICE — SCISSOR CURVED ENDOPATH 5MM

## (undated) DEVICE — RELOAD GREEN FOR ECHELON

## (undated) DEVICE — COVER CAMERA/LASER 9X96IN

## (undated) DEVICE — GOWN SMARTSLEEVE AAMI LVL4 LG

## (undated) DEVICE — SUT VICRYL+ 27 UR-6 VIOL

## (undated) DEVICE — APPLICATOR STRL COT 2INNR 6IN

## (undated) DEVICE — TROCAR LAPSCP XCEL 12MM 10CM

## (undated) DEVICE — DRESSING TELFA + BARR 4X6IN